# Patient Record
Sex: MALE | Race: WHITE | NOT HISPANIC OR LATINO | Employment: OTHER | URBAN - METROPOLITAN AREA
[De-identification: names, ages, dates, MRNs, and addresses within clinical notes are randomized per-mention and may not be internally consistent; named-entity substitution may affect disease eponyms.]

---

## 2019-09-24 ENCOUNTER — APPOINTMENT (OUTPATIENT)
Dept: PREADMISSION TESTING | Facility: HOSPITAL | Age: 79
End: 2019-09-24

## 2019-09-24 ENCOUNTER — CONSULT (OUTPATIENT)
Dept: CARDIOLOGY | Facility: CLINIC | Age: 79
End: 2019-09-24

## 2019-09-24 VITALS
HEART RATE: 70 BPM | WEIGHT: 181.6 LBS | DIASTOLIC BLOOD PRESSURE: 54 MMHG | HEIGHT: 73 IN | BODY MASS INDEX: 24.07 KG/M2 | SYSTOLIC BLOOD PRESSURE: 110 MMHG

## 2019-09-24 DIAGNOSIS — I48.3 TYPICAL ATRIAL FLUTTER (HCC): ICD-10-CM

## 2019-09-24 DIAGNOSIS — I50.21 ACUTE SYSTOLIC CONGESTIVE HEART FAILURE (HCC): ICD-10-CM

## 2019-09-24 DIAGNOSIS — I48.3 TYPICAL ATRIAL FLUTTER (HCC): Primary | ICD-10-CM

## 2019-09-24 LAB
ALBUMIN SERPL-MCNC: 4.9 G/DL (ref 3.5–5.2)
ALBUMIN/GLOB SERPL: 1.9 G/DL
ALP SERPL-CCNC: 82 U/L (ref 39–117)
ALT SERPL W P-5'-P-CCNC: 31 U/L (ref 1–41)
ANION GAP SERPL CALCULATED.3IONS-SCNC: 10 MMOL/L (ref 5–15)
AST SERPL-CCNC: 34 U/L (ref 1–40)
BILIRUB SERPL-MCNC: 0.7 MG/DL (ref 0.2–1.2)
BUN BLD-MCNC: 31 MG/DL (ref 8–23)
BUN/CREAT SERPL: 28.2 (ref 7–25)
CALCIUM SPEC-SCNC: 10 MG/DL (ref 8.6–10.5)
CHLORIDE SERPL-SCNC: 97 MMOL/L (ref 98–107)
CO2 SERPL-SCNC: 34 MMOL/L (ref 22–29)
CREAT BLD-MCNC: 1.1 MG/DL (ref 0.76–1.27)
DEPRECATED RDW RBC AUTO: 42.6 FL (ref 37–54)
ERYTHROCYTE [DISTWIDTH] IN BLOOD BY AUTOMATED COUNT: 13.2 % (ref 12.3–15.4)
GFR SERPL CREATININE-BSD FRML MDRD: 65 ML/MIN/1.73
GLOBULIN UR ELPH-MCNC: 2.6 GM/DL
GLUCOSE BLD-MCNC: 83 MG/DL (ref 65–99)
HCT VFR BLD AUTO: 41.1 % (ref 37.5–51)
HGB BLD-MCNC: 13 G/DL (ref 13–17.7)
MCH RBC QN AUTO: 27.7 PG (ref 26.6–33)
MCHC RBC AUTO-ENTMCNC: 31.6 G/DL (ref 31.5–35.7)
MCV RBC AUTO: 87.4 FL (ref 79–97)
PLATELET # BLD AUTO: 106 10*3/MM3 (ref 140–450)
PMV BLD AUTO: 12.8 FL (ref 6–12)
POTASSIUM BLD-SCNC: 4.6 MMOL/L (ref 3.5–5.2)
PROT SERPL-MCNC: 7.5 G/DL (ref 6–8.5)
RBC # BLD AUTO: 4.7 10*6/MM3 (ref 4.14–5.8)
SODIUM BLD-SCNC: 141 MMOL/L (ref 136–145)
WBC NRBC COR # BLD: 6.43 10*3/MM3 (ref 3.4–10.8)

## 2019-09-24 PROCEDURE — 36415 COLL VENOUS BLD VENIPUNCTURE: CPT

## 2019-09-24 PROCEDURE — 85027 COMPLETE CBC AUTOMATED: CPT | Performed by: INTERNAL MEDICINE

## 2019-09-24 PROCEDURE — 80053 COMPREHEN METABOLIC PANEL: CPT | Performed by: INTERNAL MEDICINE

## 2019-09-24 PROCEDURE — 99204 OFFICE O/P NEW MOD 45 MIN: CPT | Performed by: INTERNAL MEDICINE

## 2019-09-24 RX ORDER — METOPROLOL SUCCINATE 100 MG/1
100 TABLET, EXTENDED RELEASE ORAL EVERY MORNING
Refills: 5 | Status: ON HOLD | COMMUNITY
Start: 2019-09-10 | End: 2019-10-02 | Stop reason: SDUPTHER

## 2019-09-24 RX ORDER — GLIMEPIRIDE 2 MG/1
2 TABLET ORAL DAILY
Refills: 5 | COMMUNITY
Start: 2019-09-10 | End: 2023-02-17

## 2019-09-24 RX ORDER — DIGOXIN 125 MCG
125 TABLET ORAL DAILY
Refills: 0 | COMMUNITY
Start: 2019-09-03 | End: 2019-10-02 | Stop reason: HOSPADM

## 2019-09-24 RX ORDER — LOSARTAN POTASSIUM 50 MG/1
50 TABLET ORAL DAILY
Refills: 0 | Status: ON HOLD | COMMUNITY
Start: 2019-09-03 | End: 2019-10-02 | Stop reason: SDUPTHER

## 2019-09-24 RX ORDER — METOPROLOL SUCCINATE 50 MG/1
50 TABLET, EXTENDED RELEASE ORAL DAILY
Refills: 0 | COMMUNITY
Start: 2019-09-03 | End: 2019-10-02 | Stop reason: HOSPADM

## 2019-09-24 RX ORDER — BUMETANIDE 1 MG/1
2 TABLET ORAL DAILY
Refills: 0 | Status: ON HOLD | COMMUNITY
Start: 2019-09-03 | End: 2019-10-02 | Stop reason: SDUPTHER

## 2019-09-24 RX ORDER — APIXABAN 5 MG/1
5 TABLET, FILM COATED ORAL 2 TIMES DAILY
Refills: 0 | COMMUNITY
Start: 2019-09-03 | End: 2020-07-08 | Stop reason: SDUPTHER

## 2019-09-24 RX ORDER — ATORVASTATIN CALCIUM 40 MG/1
40 TABLET, FILM COATED ORAL DAILY
Refills: 2 | Status: ON HOLD | COMMUNITY
Start: 2019-08-02 | End: 2019-10-02 | Stop reason: SDUPTHER

## 2019-09-24 RX ORDER — BIOTIN 1 MG
TABLET ORAL SEE ADMIN INSTRUCTIONS
Refills: 0 | COMMUNITY
Start: 2019-09-03 | End: 2019-09-24

## 2019-09-24 NOTE — PROGRESS NOTES
Sung Merino  1940  PCP: Chemo Sahu MD    SUBJECTIVE:   Sung Merino is a 79 y.o. male seen for a consultation visit regarding the following:     Chief Complaint:   Chief Complaint   Patient presents with   • Irregular Heart Beat     CONSULT          Consultation is requested by Andrews Butler MD for evaluation of Irregular Heart Beat (CONSULT)        History:  This is a 79-year-old patient referred by Dr. Butler for further evaluation and management of atrial flutter and chronic systolic heart failure.  The patient presented to the hospital in [place] in August 2019 with acute systolic heart failure and atrial flutter with rapid rates.  He was rate controlled for the atrial flutter and treated for acute systolic heart failure.  His EF measured 25%.  The echo also showed pulmonary hypertension.  Prior to admission the patient reported having 5 days of shortness of breath and tachycardia.      Cardiac PMH: (Old records have been reviewed and summarized below)  1.  Atrial flutter-diagnosed August 2019  2.  Acute systolic heart failure-EF 25%-August 2019  3.  Pulmonary hypertension-by echo-August 2019  4.  Diabetes  5.  Hypertension    Past Medical History, Past Surgical History, Family history, Social History, and Medications were all reviewed with the patient today and updated as necessary.       Current Outpatient Medications:   •  atorvastatin (LIPITOR) 40 MG tablet, Take 40 mg by mouth Daily., Disp: , Rfl: 2  •  bumetanide (BUMEX) 1 MG tablet, Take 2 mg by mouth Daily., Disp: , Rfl: 0  •  digoxin (LANOXIN) 125 MCG tablet, Take 125 mcg by mouth Daily., Disp: , Rfl: 0  •  ELIQUIS 5 MG tablet tablet, Take 5 mg by mouth 2 (Two) Times a Day. Dr. Zuñiga instructed patient to continue, Disp: , Rfl: 0  •  glimepiride (AMARYL) 2 MG tablet, Take 2 mg by mouth Daily., Disp: , Rfl: 5  •  losartan (COZAAR) 50 MG tablet, Take 50 mg by mouth Daily., Disp: , Rfl: 0  •  metoprolol succinate XL (TOPROL-XL) 100  MG 24 hr tablet, Take 100 mg by mouth Every Morning., Disp: , Rfl: 5  •  metoprolol succinate XL (TOPROL-XL) 50 MG 24 hr tablet, Take 50 mg by mouth Daily., Disp: , Rfl: 0    No Known Allergies      Past Medical History:   Diagnosis Date   • Abnormal heart rhythm    • Anxiety    • CHF (congestive heart failure) (CMS/HCC)    • Diabetes mellitus (CMS/HCC)    • Easy bruising    • Heart failure (CMS/HCC)    • Hernia, hiatal    • Hyperlipidemia    • Hypertension    • Irregular heart beat    • On home oxygen therapy     2L NC during the day; patient states he does not wear any at night   • Vascular disease    • Wears glasses     reading glasses     Past Surgical History:   Procedure Laterality Date   • CARDIAC CATHETERIZATION     • COLONOSCOPY     • POLYPECTOMY     • TUMOR REMOVAL      From colon      Family History   Problem Relation Age of Onset   • Heart attack Mother    • Cancer Father      Social History     Tobacco Use   • Smoking status: Former Smoker     Types: Cigarettes     Last attempt to quit: 2013     Years since quittin.0   • Smokeless tobacco: Former User     Types: Chew     Quit date: 2019   Substance Use Topics   • Alcohol use: No     Frequency: Never       ROS:  Review of Systems:  General: Increased wt gain and fatigue  Skin: no rashes, lumps, or other skin changes  HEENT: no dizziness, lightheadedness, or vision changes  Respiratory: no cough or hemoptysis  Cardiovascular: + SOB/SAUNDERS, increased swelling that has now improved  Gastrointestinal: no black/tarry stools or diarrhea  Urinary: no change in frequency or urgency  Peripheral Vascular: no claudication or leg cramps  Musculoskeletal: no muscle or joint pain/stiffness  Psychiatric: no depression or excessive stress  Neurological: no sensory or motor loss, no syncope  Hematologic: no anemia, easy bruising or bleeding  Endocrine: no thyroid problems, nor heat or cold intolerance       PHYSICAL EXAM:   /54 (BP Location: Left  "arm, Patient Position: Sitting)   Pulse 70   Ht 185.4 cm (73\")   Wt 82.4 kg (181 lb 9.6 oz)   BMI 23.96 kg/m²      Wt Readings from Last 5 Encounters:   09/24/19 82.4 kg (181 lb 9.6 oz)     BP Readings from Last 5 Encounters:   09/24/19 110/54       General-Well Nourished, Well developed  Eyes - PERRLA  Neck- supple, No mass  CV- regular rate and rhythm, no MRG  Lung- clear bilaterally  Abd- soft, +BS  Musc/skel - Norm strength and range of motion  Skin- warm and dry  Neuro - Alert & Oriented x 3, appropriate mood.    Medical problems and test results were reviewed with the patient today.     No results found for this or any previous visit.      No results found for: CHOL, HDL, HDLC, LDL, LDLC, VLDL    EKG:  (EKG/Tracing has been independently visualized by me and summarized below)      ECG 12 Lead  Date/Time: 9/25/2019 6:06 PM  Performed by: Adalberto Zuñiga MD  Authorized by: Adalberto Zuñiga MD   Comparison: not compared with previous ECG   Previous ECG: no previous ECG available  Rhythm: sinus rhythm  Rate: normal  BPM: 70  Conduction: right bundle branch block    Clinical impression: abnormal EKG            ASSESSMENT and PLAN  1.  Atrial flutter-most likely related to pulmonary hypertension-we will plan for atrial flutter ablation. We discussed potential Electrophysiology Study with possible ablation.  I described the procedures in detail including risks, alternatives, and benefits.  We also discussed that risks include bleeding, vascular damage, stroke, MI, esophageal damage, cardiac perforation, and even death.  2.  Acute systolic heart failure-unclear if heart failure induced A flutter or if flutter induced heart failure.  He currently has a LifeVest in place.  We will check a echo in the hospital to determine if his LifeVest needs to be continued.  3.  Pulmonary hypertension-remeasure on echo    Return for after procedure.    1. Atrial Flutter ablation  2. ECHO day of ablation to see if Life Vest " needs to be continued.   3. No Meds to hold        Adalberto Zuñiga M.D., FBRIANC, F.H.R.S.  Cardiology/Electrophysiology  09/25/19  6:05 PM

## 2019-09-24 NOTE — PAT

## 2019-09-25 PROBLEM — I50.21 ACUTE SYSTOLIC CONGESTIVE HEART FAILURE: Status: ACTIVE | Noted: 2019-09-25

## 2019-09-25 PROCEDURE — 93000 ELECTROCARDIOGRAM COMPLETE: CPT | Performed by: INTERNAL MEDICINE

## 2019-10-02 ENCOUNTER — HOSPITAL ENCOUNTER (OUTPATIENT)
Facility: HOSPITAL | Age: 79
Setting detail: HOSPITAL OUTPATIENT SURGERY
Discharge: HOME | End: 2019-10-02
Attending: INTERNAL MEDICINE | Admitting: INTERNAL MEDICINE

## 2019-10-02 ENCOUNTER — APPOINTMENT (OUTPATIENT)
Dept: CARDIOLOGY | Facility: HOSPITAL | Age: 79
End: 2019-10-02

## 2019-10-02 VITALS
TEMPERATURE: 97.1 F | HEART RATE: 61 BPM | RESPIRATION RATE: 12 BRPM | WEIGHT: 177 LBS | DIASTOLIC BLOOD PRESSURE: 65 MMHG | HEIGHT: 73 IN | BODY MASS INDEX: 23.46 KG/M2 | SYSTOLIC BLOOD PRESSURE: 134 MMHG | OXYGEN SATURATION: 95 %

## 2019-10-02 DIAGNOSIS — I48.3 TYPICAL ATRIAL FLUTTER (HCC): ICD-10-CM

## 2019-10-02 LAB
ASCENDING AORTA: 3 CM
AV VENA CONTRACTA: 0.4 CM
BH CV ECHO MEAS - AI DEC SLOPE: 232.5 CM/SEC^2
BH CV ECHO MEAS - AI MAX PG: 47.7 MMHG
BH CV ECHO MEAS - AI MAX VEL: 345.4 CM/SEC
BH CV ECHO MEAS - AI P1/2T: 435 MSEC
BH CV ECHO MEAS - AO MAX PG (FULL): 4.2 MMHG
BH CV ECHO MEAS - AO MAX PG: 6.7 MMHG
BH CV ECHO MEAS - AO MEAN PG (FULL): 2.1 MMHG
BH CV ECHO MEAS - AO MEAN PG: 3.3 MMHG
BH CV ECHO MEAS - AO ROOT DIAM: 3.3 CM
BH CV ECHO MEAS - AO V2 MAX: 129.3 CM/SEC
BH CV ECHO MEAS - AO V2 MEAN: 86.3 CM/SEC
BH CV ECHO MEAS - AO V2 VTI: 27 CM
BH CV ECHO MEAS - ASC AORTA: 3 CM
BH CV ECHO MEAS - AVA(I,A): 1.9 CM^2
BH CV ECHO MEAS - AVA(I,D): 1.9 CM^2
BH CV ECHO MEAS - AVA(V,A): 1.8 CM^2
BH CV ECHO MEAS - AVA(V,D): 1.8 CM^2
BH CV ECHO MEAS - BSA(HAYCOCK): 2 M^2
BH CV ECHO MEAS - BSA: 2 M^2
BH CV ECHO MEAS - BZI_BMI: 23.4 KILOGRAMS/M^2
BH CV ECHO MEAS - BZI_METRIC_HEIGHT: 185.4 CM
BH CV ECHO MEAS - BZI_METRIC_WEIGHT: 80.3 KG
BH CV ECHO MEAS - EDV(CUBED): 89.5 ML
BH CV ECHO MEAS - EDV(MOD-SP2): 124 ML
BH CV ECHO MEAS - EDV(MOD-SP4): 128 ML
BH CV ECHO MEAS - EDV(TEICH): 91.1 ML
BH CV ECHO MEAS - EF(CUBED): 74.6 %
BH CV ECHO MEAS - EF(MOD-BP): 54 %
BH CV ECHO MEAS - EF(MOD-SP2): 56.5 %
BH CV ECHO MEAS - EF(MOD-SP4): 51.6 %
BH CV ECHO MEAS - EF(TEICH): 66.6 %
BH CV ECHO MEAS - ESV(CUBED): 22.7 ML
BH CV ECHO MEAS - ESV(MOD-SP2): 54 ML
BH CV ECHO MEAS - ESV(MOD-SP4): 62 ML
BH CV ECHO MEAS - ESV(TEICH): 30.4 ML
BH CV ECHO MEAS - FS: 36.7 %
BH CV ECHO MEAS - IVS/LVPW: 1.1
BH CV ECHO MEAS - IVSD: 1.1 CM
BH CV ECHO MEAS - LA DIMENSION: 3.2 CM
BH CV ECHO MEAS - LAD MAJOR: 5.8 CM
BH CV ECHO MEAS - LAT PEAK E' VEL: 7.6 CM/SEC
BH CV ECHO MEAS - LATERAL E/E' RATIO: 6.5
BH CV ECHO MEAS - LV DIASTOLIC VOL/BSA (35-75): 62.7 ML/M^2
BH CV ECHO MEAS - LV MASS(C)D: 159.8 GRAMS
BH CV ECHO MEAS - LV MASS(C)DI: 78.2 GRAMS/M^2
BH CV ECHO MEAS - LV MAX PG: 2.5 MMHG
BH CV ECHO MEAS - LV MEAN PG: 1.3 MMHG
BH CV ECHO MEAS - LV SYSTOLIC VOL/BSA (12-30): 30.3 ML/M^2
BH CV ECHO MEAS - LV V1 MAX: 78.4 CM/SEC
BH CV ECHO MEAS - LV V1 MEAN: 51 CM/SEC
BH CV ECHO MEAS - LV V1 VTI: 17.1 CM
BH CV ECHO MEAS - LVIDD: 4.5 CM
BH CV ECHO MEAS - LVIDS: 2.8 CM
BH CV ECHO MEAS - LVLD AP2: 8.6 CM
BH CV ECHO MEAS - LVLD AP4: 8.7 CM
BH CV ECHO MEAS - LVLS AP2: 6.7 CM
BH CV ECHO MEAS - LVLS AP4: 6.7 CM
BH CV ECHO MEAS - LVOT AREA (M): 3.1 CM^2
BH CV ECHO MEAS - LVOT AREA: 3 CM^2
BH CV ECHO MEAS - LVOT DIAM: 2 CM
BH CV ECHO MEAS - LVPWD: 1 CM
BH CV ECHO MEAS - MED PEAK E' VEL: 7.2 CM/SEC
BH CV ECHO MEAS - MEDIAL E/E' RATIO: 6.8
BH CV ECHO MEAS - MV A MAX VEL: 63.1 CM/SEC
BH CV ECHO MEAS - MV DEC TIME: 0.35 SEC
BH CV ECHO MEAS - MV E MAX VEL: 50.5 CM/SEC
BH CV ECHO MEAS - MV E/A: 0.8
BH CV ECHO MEAS - MV MAX PG: 3.3 MMHG
BH CV ECHO MEAS - MV MEAN PG: 1.3 MMHG
BH CV ECHO MEAS - MV V2 MAX: 90.7 CM/SEC
BH CV ECHO MEAS - MV V2 MEAN: 51.7 CM/SEC
BH CV ECHO MEAS - MV V2 VTI: 26.4 CM
BH CV ECHO MEAS - MVA(VTI): 2 CM^2
BH CV ECHO MEAS - PA ACC SLOPE: 287.5 CM/SEC^2
BH CV ECHO MEAS - PA ACC TIME: 0.14 SEC
BH CV ECHO MEAS - PA MAX PG: 2.7 MMHG
BH CV ECHO MEAS - PA PR(ACCEL): 17.2 MMHG
BH CV ECHO MEAS - PA V2 MAX: 81.4 CM/SEC
BH CV ECHO MEAS - RAP SYSTOLE: 3 MMHG
BH CV ECHO MEAS - RVSP: 28 MMHG
BH CV ECHO MEAS - SI(CUBED): 32.7 ML/M^2
BH CV ECHO MEAS - SI(LVOT): 25.2 ML/M^2
BH CV ECHO MEAS - SI(MOD-SP2): 34.3 ML/M^2
BH CV ECHO MEAS - SI(MOD-SP4): 32.3 ML/M^2
BH CV ECHO MEAS - SI(TEICH): 29.7 ML/M^2
BH CV ECHO MEAS - SV(CUBED): 66.7 ML
BH CV ECHO MEAS - SV(LVOT): 51.5 ML
BH CV ECHO MEAS - SV(MOD-SP2): 70 ML
BH CV ECHO MEAS - SV(MOD-SP4): 66 ML
BH CV ECHO MEAS - SV(TEICH): 60.7 ML
BH CV ECHO MEAS - TAPSE (>1.6): 2.4 CM2
BH CV ECHO MEAS - TR MAX PG: 25 MMHG
BH CV ECHO MEAS - TR MAX VEL: 249.8 CM/SEC
BH CV ECHO MEASUREMENTS AVERAGE E/E' RATIO: 6.82
BH CV VAS BP LEFT ARM: NORMAL MMHG
BH CV XLRA - RV BASE: 3.7 CM
BH CV XLRA - RV LENGTH: 7 CM
BH CV XLRA - RV MID: 3.2 CM
BH CV XLRA - TDI S': 11.4 CM/SEC
GLUCOSE BLDC GLUCOMTR-MCNC: 109 MG/DL (ref 70–130)
GLUCOSE BLDC GLUCOMTR-MCNC: 94 MG/DL (ref 70–130)
LEFT ATRIUM VOLUME INDEX: 29.4 ML/M^2
LEFT ATRIUM VOLUME: 60 ML
MV VENA CONTRACTA: 0.4 CM

## 2019-10-02 PROCEDURE — 25010000002 MIDAZOLAM PER 1 MG: Performed by: INTERNAL MEDICINE

## 2019-10-02 PROCEDURE — 82962 GLUCOSE BLOOD TEST: CPT

## 2019-10-02 PROCEDURE — 93306 TTE W/DOPPLER COMPLETE: CPT

## 2019-10-02 PROCEDURE — 99153 MOD SED SAME PHYS/QHP EA: CPT | Performed by: INTERNAL MEDICINE

## 2019-10-02 PROCEDURE — 25010000002 FENTANYL CITRATE (PF) 100 MCG/2ML SOLUTION: Performed by: INTERNAL MEDICINE

## 2019-10-02 PROCEDURE — C1894 INTRO/SHEATH, NON-LASER: HCPCS | Performed by: INTERNAL MEDICINE

## 2019-10-02 PROCEDURE — C1730 CATH, EP, 19 OR FEW ELECT: HCPCS | Performed by: INTERNAL MEDICINE

## 2019-10-02 PROCEDURE — 93306 TTE W/DOPPLER COMPLETE: CPT | Performed by: INTERNAL MEDICINE

## 2019-10-02 PROCEDURE — 93653 COMPRE EP EVAL TX SVT: CPT | Performed by: INTERNAL MEDICINE

## 2019-10-02 PROCEDURE — C1731 CATH, EP, 20 OR MORE ELEC: HCPCS | Performed by: INTERNAL MEDICINE

## 2019-10-02 PROCEDURE — 99152 MOD SED SAME PHYS/QHP 5/>YRS: CPT | Performed by: INTERNAL MEDICINE

## 2019-10-02 PROCEDURE — 93010 ELECTROCARDIOGRAM REPORT: CPT | Performed by: INTERNAL MEDICINE

## 2019-10-02 PROCEDURE — 25010000002 SULFUR HEXAFLUORIDE MICROSPH 60.7-25 MG RECONSTITUTED SUSPENSION: Performed by: PHYSICIAN ASSISTANT

## 2019-10-02 PROCEDURE — 93005 ELECTROCARDIOGRAM TRACING: CPT | Performed by: INTERNAL MEDICINE

## 2019-10-02 PROCEDURE — 93609 INTRA-VNTR MAPG TCHYCAR SITE: CPT | Performed by: INTERNAL MEDICINE

## 2019-10-02 PROCEDURE — S0260 H&P FOR SURGERY: HCPCS | Performed by: INTERNAL MEDICINE

## 2019-10-02 PROCEDURE — 93621 COMP EP EVL L PAC&REC C SINS: CPT | Performed by: INTERNAL MEDICINE

## 2019-10-02 PROCEDURE — C1733 CATH, EP, OTHR THAN COOL-TIP: HCPCS | Performed by: INTERNAL MEDICINE

## 2019-10-02 PROCEDURE — 25010000003 LIDOCAINE 1 % SOLUTION: Performed by: INTERNAL MEDICINE

## 2019-10-02 RX ORDER — FENTANYL CITRATE 50 UG/ML
INJECTION, SOLUTION INTRAMUSCULAR; INTRAVENOUS AS NEEDED
Status: DISCONTINUED | OUTPATIENT
Start: 2019-10-02 | End: 2019-10-02 | Stop reason: HOSPADM

## 2019-10-02 RX ORDER — BUMETANIDE 1 MG/1
1 TABLET ORAL DAILY
Qty: 30 TABLET | Refills: 3
Start: 2019-10-02 | End: 2019-10-02 | Stop reason: SDUPTHER

## 2019-10-02 RX ORDER — MIDAZOLAM HYDROCHLORIDE 1 MG/ML
INJECTION INTRAMUSCULAR; INTRAVENOUS AS NEEDED
Status: DISCONTINUED | OUTPATIENT
Start: 2019-10-02 | End: 2019-10-02 | Stop reason: HOSPADM

## 2019-10-02 RX ORDER — ACETAMINOPHEN 160 MG/5ML
650 SOLUTION ORAL EVERY 4 HOURS PRN
Status: DISCONTINUED | OUTPATIENT
Start: 2019-10-02 | End: 2019-10-02 | Stop reason: HOSPADM

## 2019-10-02 RX ORDER — METOPROLOL SUCCINATE 100 MG/1
100 TABLET, EXTENDED RELEASE ORAL EVERY MORNING
Qty: 30 TABLET | Refills: 5 | Status: SHIPPED | OUTPATIENT
Start: 2019-10-02 | End: 2020-07-08 | Stop reason: SDUPTHER

## 2019-10-02 RX ORDER — IBUPROFEN 400 MG/1
400 TABLET ORAL EVERY 6 HOURS PRN
Status: DISCONTINUED | OUTPATIENT
Start: 2019-10-02 | End: 2019-10-02 | Stop reason: HOSPADM

## 2019-10-02 RX ORDER — LIDOCAINE HYDROCHLORIDE 10 MG/ML
INJECTION, SOLUTION INFILTRATION; PERINEURAL AS NEEDED
Status: DISCONTINUED | OUTPATIENT
Start: 2019-10-02 | End: 2019-10-02 | Stop reason: HOSPADM

## 2019-10-02 RX ORDER — ONDANSETRON 2 MG/ML
4 INJECTION INTRAMUSCULAR; INTRAVENOUS EVERY 6 HOURS PRN
Status: DISCONTINUED | OUTPATIENT
Start: 2019-10-02 | End: 2019-10-02 | Stop reason: HOSPADM

## 2019-10-02 RX ORDER — OXYCODONE HYDROCHLORIDE AND ACETAMINOPHEN 5; 325 MG/1; MG/1
1 TABLET ORAL EVERY 4 HOURS PRN
Status: DISCONTINUED | OUTPATIENT
Start: 2019-10-02 | End: 2019-10-02 | Stop reason: HOSPADM

## 2019-10-02 RX ORDER — ATORVASTATIN CALCIUM 40 MG/1
40 TABLET, FILM COATED ORAL DAILY
Qty: 30 TABLET | Refills: 2 | Status: SHIPPED | OUTPATIENT
Start: 2019-10-02 | End: 2019-12-30

## 2019-10-02 RX ORDER — ACETAMINOPHEN 325 MG/1
650 TABLET ORAL EVERY 4 HOURS PRN
Status: DISCONTINUED | OUTPATIENT
Start: 2019-10-02 | End: 2019-10-02 | Stop reason: HOSPADM

## 2019-10-02 RX ORDER — LOSARTAN POTASSIUM 50 MG/1
50 TABLET ORAL DAILY
Qty: 30 TABLET | Refills: 2 | Status: SHIPPED | OUTPATIENT
Start: 2019-10-02 | End: 2019-12-30

## 2019-10-02 RX ORDER — BUMETANIDE 1 MG/1
1 TABLET ORAL DAILY
Qty: 30 TABLET | Refills: 3
Start: 2019-10-02 | End: 2020-07-08 | Stop reason: SDUPTHER

## 2019-10-02 RX ORDER — ACETAMINOPHEN 650 MG/1
650 SUPPOSITORY RECTAL EVERY 4 HOURS PRN
Status: DISCONTINUED | OUTPATIENT
Start: 2019-10-02 | End: 2019-10-02 | Stop reason: HOSPADM

## 2019-10-02 RX ADMIN — SULFUR HEXAFLUORIDE 2 ML: KIT at 16:35

## 2019-10-02 NOTE — PROCEDURES
PRE-ELECTROPHYSIOLOGY STUDY DIAGNOSES  1. Typical atrial flutter.     PROCEDURE PERFORMED  1. Electrophysiology testing with right-sided atrial flutter ablation.  2. Left atrial pacing recording from the coronary sinus.  3. Interatrial mapping.    Anesthesia:    I was present with the patient for the duration of moderate sedation and supervised staff who had no other duties and monitored the patient for the entire procedure     Name of independent trained observer: Anna Yuan RN  Intra-Service start time: 1320  Intra-Service end time: 1349    Estimated Blood Loss: Less than 10 mL     Specimens: None     PROCEDURE IN DETAIL: The patient was brought into the EP lab in a fasting  state. The right and left groins were prepped and draped in the usual  sterile fashion. Access was obtained in the right femoral vein via the  Seldinger technique over which an 8 and 7-Tuvaluan sheath was placed.  Access was obtained in the left femoral vein via the Seldinger technique  over which a 5-Tuvaluan sheath was placed. Through the 7-Tuvaluan sheath, a  Halo mapping catheter was placed in the high right atrium. Through the  5-Tuvaluan sheath, a 5-Tuvaluan catheter was placed at the RV apex. Through  the 8-Tuvaluan sheath, a large curved 8 mm Blazer II ablation catheter was  placed in the coronary sinus. Pacing, recording and mapping from the left atrium was done.  Pacing across the isthmus preablation was done with conduction times measured. A Right-sided atrial flutter line was then performed using 70 W of energy, 60 degree heat for  120 seconds. Two lines were placed.  Ablation  catheter was then placed back into the coronary sinus. Pacing from the  coronary sinus showed bidirectional block across the tricuspid annulus.  The His bundle was then mapped out and formal electrophysiology test was  performed.     1. Baseline rhythm showed normal sinus heart rhythm with an R-R interval of 998,   2. NC interval of 202,   3. QRS of 146,   4. QT  of 453,   5. AH of 91,   6. HV of 61.    7. Sinus node recovery time at 600 was 1132 at 400 was 974.   8. The AV Wenckebach cycle length was 480.   9. AV node refractory period at 600 was 350  10. The VA Wenckebach cycle length was 0.   11. VA node refractory period at 600 was 0, at 400 was 0  12. The ventricular effective refractory period at 600 was 210, at 400 was 210.     The sheaths were then pulled. Hemostasis was achieved. The patient recovered from his sedation, transferred from the lab in a stable condition.     IMPRESSION:   1. Successful catheter mapping ablation of right-sided isthmus-dependent atrial flutter substrate.

## 2019-10-02 NOTE — H&P
Primary Cardiologist: Dr. Butelr     Chief Complaint: aflutter       Subjective:     Patient is a 79 y.o. male who presents with typical atrial flutter for EPS +/- RFA. He was recently seen in the office last week and has not had any changes since that time. No cp, fevers, chills.       History:  This is a 79-year-old patient referred by Dr. Butler for further evaluation and management of atrial flutter and chronic systolic heart failure.  The patient presented to the hospital in [place] in 2019 with acute systolic heart failure and atrial flutter with rapid rates.  He was rate controlled for the atrial flutter and treated for acute systolic heart failure.  His EF measured 25%.  The echo also showed pulmonary hypertension.  Prior to admission the patient reported having 5 days of shortness of breath and tachycardia.        Cardiac PMH: (Old records have been reviewed and summarized below)  1.  Atrial flutter-diagnosed 2019  2.  Acute systolic heart failure-EF 25%-2019  3.  Pulmonary hypertension-by echo-2019  4.  Diabetes  5.  Hypertension    Past Medical History:   Diagnosis Date   • Abnormal heart rhythm    • Anxiety    • CHF (congestive heart failure) (CMS/HCC)    • Diabetes mellitus (CMS/HCC)    • Easy bruising    • Heart failure (CMS/HCC)    • Hernia, hiatal    • Hyperlipidemia    • Hypertension    • Irregular heart beat    • On home oxygen therapy     2L NC during the day; patient states he does not wear any at night   • Vascular disease    • Wears glasses     reading glasses      Past Surgical History:   Procedure Laterality Date   • CARDIAC CATHETERIZATION     • COLONOSCOPY     • POLYPECTOMY     • TUMOR REMOVAL      From colon       No Known Allergies  Social History     Tobacco Use   • Smoking status: Former Smoker     Types: Cigarettes     Last attempt to quit: 2013     Years since quittin.0   • Smokeless tobacco: Former User     Types: Chew     Quit date: 2019    Substance Use Topics   • Alcohol use: No     Frequency: Never      FH:   Family History   Problem Relation Age of Onset   • Heart attack Mother    • Cancer Father         No current facility-administered medications for this encounter.     Review of Systems  Review of Systems:  General: no recent weight loss/gain, weakness or fatigue  Skin: no rashes, lumps, or other skin changes  HEENT: no dizziness, lightheadedness, or vision changes  Respiratory: no cough or hemoptysis  Cardiovascular: + palpitations, and tachycardia  Gastrointestinal: no black/tarry stools or diarrhea  Urinary: no change in frequency or urgency  Peripheral Vascular: no claudication or leg cramps  Musculoskeletal: no muscle or joint pain/stiffness  Psychiatric: no depression or excessive stress  Neurological: no sensory or motor loss, no syncope  Hematologic: no anemia, easy bruising or bleeding  Endocrine: no thyroid problems, nor heat or cold intolerance        Objective:       There were no vitals taken for this visit.    No intake/output data recorded.  No intake/output data recorded.    Physical Exam:  General-Well Nourished, Well developed  Eyes - PERRLA  Neck- supple, No mass  CV- regular rate and rhythm, no MRG  Lung- clear bilaterally  Abd- soft, +BS  Musc/skel - Norm strength and range of motion  Skin- warm and dry  Neuro - Alert & Oriented x 3, appropriate mood.      Data Review:     No results found for this or any previous visit (from the past 24 hour(s)).        Assessment:     Typical atrial flutter (CMS/HCC)         Plan:     1.  Atrial flutter-most likely related to pulmonary hypertension-we will plan for atrial flutter ablation. We discussed potential Electrophysiology Study with possible ablation.  I described the procedures in detail including risks, alternatives, and benefits.  We also discussed that risks include bleeding, vascular damage, stroke, MI, esophageal damage, cardiac perforation, and even death.  2.  Acute  systolic heart failure-unclear if heart failure induced A flutter or if flutter induced heart failure.  He currently has a LifeVest in place.  We will recheck an echo today to determine if his LifeVest needs to be continued.  3.  Pulmonary hypertension-remeasure on echo    Electronically signed by ALBERTO Yepez, 10/02/19, 10:18 AM.

## 2019-11-05 ENCOUNTER — OFFICE VISIT (OUTPATIENT)
Dept: CARDIOLOGY | Facility: CLINIC | Age: 79
End: 2019-11-05

## 2019-11-05 VITALS
HEART RATE: 70 BPM | DIASTOLIC BLOOD PRESSURE: 74 MMHG | WEIGHT: 179 LBS | OXYGEN SATURATION: 94 % | SYSTOLIC BLOOD PRESSURE: 132 MMHG | HEIGHT: 73 IN | BODY MASS INDEX: 23.72 KG/M2

## 2019-11-05 DIAGNOSIS — I50.21 ACUTE SYSTOLIC CONGESTIVE HEART FAILURE (HCC): ICD-10-CM

## 2019-11-05 DIAGNOSIS — I48.3 TYPICAL ATRIAL FLUTTER (HCC): Primary | ICD-10-CM

## 2019-11-05 PROCEDURE — 99213 OFFICE O/P EST LOW 20 MIN: CPT | Performed by: INTERNAL MEDICINE

## 2019-11-05 PROCEDURE — 93000 ELECTROCARDIOGRAM COMPLETE: CPT | Performed by: INTERNAL MEDICINE

## 2019-11-05 NOTE — PROGRESS NOTES
Sung Merino  1940  PCP: Chemo Sahu MD    SUBJECTIVE:   Sung Merino is a 79 y.o. male seen for a consultation visit regarding the following:     Chief Complaint:   Chief Complaint   Patient presents with   • Typical atrial flutter        HPI:  Patient has been cardiac stable. Feeling much better. No further episodes.     History:  This is a 79-year-old patient referred by Dr. Butler for further evaluation and management of atrial flutter and chronic systolic heart failure.  The patient presented to the hospital in [place] in August 2019 with acute systolic heart failure and atrial flutter with rapid rates.  He was rate controlled for the atrial flutter and treated for acute systolic heart failure.  His EF measured 25%.  The echo also showed pulmonary hypertension.  Prior to admission the patient reported having 5 days of shortness of breath and tachycardia.      Cardiac PMH: (Old records have been reviewed and summarized below)  1.  Atrial flutter-diagnosed August 2019  2.  Acute systolic heart failure-EF 25%-August 2019  3.  Pulmonary hypertension-by echo-August 2019  4.  Diabetes  5.  Hypertension    Past Medical History, Past Surgical History, Family history, Social History, and Medications were all reviewed with the patient today and updated as necessary.       Current Outpatient Medications:   •  atorvastatin (LIPITOR) 40 MG tablet, Take 1 tablet by mouth Daily., Disp: 30 tablet, Rfl: 2  •  bumetanide (BUMEX) 1 MG tablet, Take 1 tablet by mouth Daily. (Patient taking differently: Take 1 mg by mouth Daily. 2 tabs daily), Disp: 30 tablet, Rfl: 3  •  ELIQUIS 5 MG tablet tablet, Take 5 mg by mouth 2 (Two) Times a Day. Dr. Zuñiga instructed patient to continue, Disp: , Rfl: 0  •  glimepiride (AMARYL) 2 MG tablet, Take 2 mg by mouth Daily., Disp: , Rfl: 5  •  losartan (COZAAR) 50 MG tablet, Take 1 tablet by mouth Daily., Disp: 30 tablet, Rfl: 2  •  metoprolol succinate XL (TOPROL-XL) 100 MG 24  "hr tablet, Take 1 tablet by mouth Every Morning., Disp: 30 tablet, Rfl: 5    No Known Allergies      Past Medical History:   Diagnosis Date   • Abnormal heart rhythm    • Anxiety    • CHF (congestive heart failure) (CMS/HCC)    • Diabetes mellitus (CMS/HCC)    • Easy bruising    • Heart failure (CMS/HCC)    • Hernia, hiatal    • Hyperlipidemia    • Hypertension    • Irregular heart beat    • On home oxygen therapy     2L NC during the day; patient states he does not wear any at night   • Vascular disease    • Wears glasses     reading glasses     Past Surgical History:   Procedure Laterality Date   • CARDIAC CATHETERIZATION     • CARDIAC ELECTROPHYSIOLOGY PROCEDURE N/A 10/2/2019    Procedure: Ablation atrial flutter;  Surgeon: Adalberto Zuñiga MD;  Location: [facility] INVASIVE LOC[facility];  Service: Cardiovascular   • COLONOSCOPY     • POLYPECTOMY     • TUMOR REMOVAL      From colon      Family History   Problem Relation Age of Onset   • Heart attack Mother    • Cancer Father      Social History     Tobacco Use   • Smoking status: Former Smoker     Types: Cigarettes     Last attempt to quit: 2013     Years since quittin.1   • Smokeless tobacco: Former User     Types: Chew     Quit date: 2019   Substance Use Topics   • Alcohol use: No     Frequency: Never           PHYSICAL EXAM:   /74 (BP Location: Left arm, Patient Position: Sitting)   Pulse 70   Ht 185.4 cm (73\")   Wt 81.2 kg (179 lb)   SpO2 94%   BMI 23.62 kg/m²      Wt Readings from Last 5 Encounters:   19 81.2 kg (179 lb)   10/02/19 80.3 kg (177 lb)   19 82.4 kg (181 lb 9.6 oz)     BP Readings from Last 5 Encounters:   19 132/74   10/02/19 134/65   19 110/54       General-Well Nourished, Well developed  Eyes - PERRLA  Neck- supple, No mass  CV- regular rate and rhythm, no MRG  Lung- clear bilaterally  Abd- soft, +BS  Musc/skel - Norm strength and range of motion  Skin- warm and dry  Neuro - Alert & Oriented x " 3, appropriate mood.    Medical problems and test results were reviewed with the patient today.     Results for orders placed or performed during the hospital encounter of 10/02/19   POC Glucose Once   Result Value Ref Range    Glucose 94 70 - 130 mg/dL   POC Glucose Once   Result Value Ref Range    Glucose 109 70 - 130 mg/dL   Adult Transthoracic Echo Complete W/ Cont if Necessary Per Protocol   Result Value Ref Range    BH CV VAS BP LEFT /59 mmHg    BSA 2.0 m^2    IVSd 1.1 cm    LVIDd 4.5 cm    LVIDs 2.8 cm    LVPWd 1.0 cm    IVS/LVPW 1.1     FS 36.7 %    EDV(Teich) 91.1 ml    ESV(Teich) 30.4 ml    EF(Teich) 66.6 %    EDV(cubed) 89.5 ml    ESV(cubed) 22.7 ml    EF(cubed) 74.6 %    LV mass(C)d 159.8 grams    LV mass(C)dI 78.2 grams/m^2    SV(Teich) 60.7 ml    SI(Teich) 29.7 ml/m^2    SV(cubed) 66.7 ml    SI(cubed) 32.7 ml/m^2    LA dimension 3.2 cm    asc Aorta Diam 3.0 cm    LVOT diam 2.0 cm    LVOT area 3.0 cm^2    LVOT area(traced) 3.1 cm^2    LAd major 5.8 cm    LVLd ap4 8.7 cm    EDV(MOD-sp4) 128.0 ml    LVLs ap4 6.7 cm    ESV(MOD-sp4) 62.0 ml    EF(MOD-sp4) 51.6 %    LVLd ap2 8.6 cm    EDV(MOD-sp2) 124.0 ml    LVLs ap2 6.7 cm    ESV(MOD-sp2) 54.0 ml    EF(MOD-sp2) 56.5 %    LA volume 60.0 ml    EF(MOD-bp) 54.0 %    SV(MOD-sp4) 66.0 ml    SI(MOD-sp4) 32.3 ml/m^2    SV(MOD-sp2) 70.0 ml    SI(MOD-sp2) 34.3 ml/m^2    LV Vargas Vol (BSA corrected) 62.7 ml/m^2    LV Sys Vol (BSA corrected) 30.3 ml/m^2    LA Volume Index 29.4 ml/m^2    MV E max cecily 50.5 cm/sec    MV A max cecily 63.1 cm/sec    MV E/A 0.8     MV V2 max 90.7 cm/sec    MV max PG 3.3 mmHg    MV V2 mean 51.7 cm/sec    MV mean PG 1.3 mmHg    MV V2 VTI 26.4 cm    MVA(VTI) 2.0 cm^2    MV dec time 0.35 sec    Ao pk cecily 129.3 cm/sec    Ao max PG 6.7 mmHg    Ao max PG (full) 4.2 mmHg    Ao V2 mean 86.3 cm/sec    Ao mean PG 3.3 mmHg    Ao mean PG (full) 2.1 mmHg    Ao V2 VTI 27.0 cm    MANSOOR(I,A) 1.9 cm^2    MANSOOR(I,D) 1.9 cm^2    MANSOOR(V,A) 1.8 cm^2    MANSOOR(V,D)  1.8 cm^2    AI max jasper 345.4 cm/sec    AI max PG 47.7 mmHg    AI dec slope 232.5 cm/sec^2    AI P1/2t 435.0 msec    LV V1 max PG 2.5 mmHg    LV V1 mean PG 1.3 mmHg    LV V1 max 78.4 cm/sec    LV V1 mean 51.0 cm/sec    LV V1 VTI 17.1 cm    SV(LVOT) 51.5 ml    SI(LVOT) 25.2 ml/m^2    PA V2 max 81.4 cm/sec    PA max PG 2.7 mmHg    PA acc slope 287.5 cm/sec^2    PA acc time 0.14 sec    TR max jasper 249.8 cm/sec     CV ECHO NELLY - TR MAX PG 25.0 mmHg    RVSP(TR) 28.0 mmHg    RAP systole 3.0 mmHg    PA pr(Accel) 17.2 mmHg    Lat E/e'  6.5     Med E/e' 6.8     Lat Peak E' Jasper 7.6 cm/sec    Med Peak E' Jasper 7.2 cm/sec     CV ECHO NELLY - BZI_BMI 23.4 kilograms/m^2     CV ECHO NELLY - BSA(HAYCOCK) 2.0 m^2     CV ECHO NELLY - BZI_METRIC_WEIGHT 80.3 kg     CV ECHO NELLY - BZI_METRIC_HEIGHT 185.4 cm    Avg E/e' ratio 6.82     TDI S' 11.40 cm/sec    RV Base 3.70 cm    RV Length 7.00 cm    RV Mid 3.20 cm    Ascending aorta 3.00 cm    AV vena contracta 0.40 cm    MV vena contracta 0.40 cm    Ao root diam 3.3 cm    TAPSE (>1.6) 2.40 cm2         No results found for: CHOL, HDL, HDLC, LDL, LDLC, VLDL    EKG:  (EKG/Tracing has been independently visualized by me and summarized below)      ECG 12 Lead  Date/Time: 11/5/2019 3:29 PM  Performed by: Adalberto Zuñiga MD  Authorized by: Adalberto Zuñiga MD   Comparison: compared with previous ECG   Similar to previous ECG  Rhythm: sinus rhythm  Rate: normal  BPM: 70  Conduction: right bundle branch block    Clinical impression: abnormal EKG            ASSESSMENT and PLAN  1.  Atrial flutter-Post atrial flutter ablation. Doing well with no recurrent events.  2.  Acute systolic heart failure-recheck of his EF showed that it had normalized post stabilization of his heart rate  3.  Pulmonary hypertension-remeasureed on echo that he had normalized    Return if symptoms worsen or fail to improve.    Follow up with Dr. Luke Zuñiga M.D., F.A.C.C,  PATRICE  Cardiology/Electrophysiology  11/05/19  3:30 PM

## 2019-12-30 RX ORDER — ATORVASTATIN CALCIUM 40 MG/1
TABLET, FILM COATED ORAL
Qty: 30 TABLET | Refills: 6 | Status: SHIPPED | OUTPATIENT
Start: 2019-12-30 | End: 2020-07-08 | Stop reason: SDUPTHER

## 2019-12-30 RX ORDER — LOSARTAN POTASSIUM 50 MG/1
TABLET ORAL
Qty: 30 TABLET | Refills: 6 | Status: SHIPPED | OUTPATIENT
Start: 2019-12-30 | End: 2020-07-08 | Stop reason: SDUPTHER

## 2020-03-12 ENCOUNTER — OFFICE VISIT (OUTPATIENT)
Dept: CARDIOLOGY | Facility: CLINIC | Age: 80
End: 2020-03-12

## 2020-03-12 VITALS
WEIGHT: 195.8 LBS | BODY MASS INDEX: 25.95 KG/M2 | HEIGHT: 73 IN | SYSTOLIC BLOOD PRESSURE: 129 MMHG | HEART RATE: 75 BPM | DIASTOLIC BLOOD PRESSURE: 65 MMHG | OXYGEN SATURATION: 97 %

## 2020-03-12 DIAGNOSIS — I10 ESSENTIAL HYPERTENSION: ICD-10-CM

## 2020-03-12 DIAGNOSIS — R06.02 SHORTNESS OF BREATH: ICD-10-CM

## 2020-03-12 DIAGNOSIS — R00.2 PALPITATIONS: Primary | ICD-10-CM

## 2020-03-12 PROCEDURE — 99203 OFFICE O/P NEW LOW 30 MIN: CPT | Performed by: PHYSICIAN ASSISTANT

## 2020-03-12 NOTE — PROGRESS NOTES
Subjective   Sung Merino is a 80 y.o. male     Chief Complaint   Patient presents with   • Establish Care     Pt here to establish cardiac care   Problem List:  1.  Atrial flutter, status post ablation, 2019.  2.  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  His dilated cardiomyopathy presumably was related to atrial flutter/rapid ventricular response rates.  2.1.  Low normal but preserved systolic function per echocardiogram, 10/19.  3.  Hypertension  4.  Dyslipidemia    HPI  The patient presents today to establish cardiac care.  This gentleman was initially seen through cardiology services after presenting to the local hospital with tacky dysrhythmic activity and acute exacerbation of congestive heart failure.  Work-up at that time suggested rather significant dilated cardiomyopathy.  Apparently, ischemia work-up at that time was benign but we do not have that record today.  This is by verbal report.  He was also noted to have atrial flutter.  This was eventually seen and evaluated through EP services.  And ablation was performed for the atrial flutter.  He reports no significant continued dysrhythmic activity, although he does note palpitations from time to time.  He has had nothing else to suggest atrial flutter however by his report.  After ablation and normalization of rate, an echocardiogram was performed in October 2019 which suggested normalized systolic function.  The patient really has done well since.  He was left on rate control and anticoagulation therapy by document review today.  He was advised to follow-up with general cardiology from that time on to EP services.  Symptomatically, the patient has no chest pain.  The patient has stable dyspnea.  He has no failure or dysrhythmic symptoms.  He is knowledgeable in terms of titration of diuretic regimen.  Routine laboratories are followed with his primary care provider and felt to be normal by patient.  He has no  further complaints otherwise.      Current Outpatient Medications   Medication Sig Dispense Refill   • atorvastatin (LIPITOR) 40 MG tablet TAKE 1 TABLET BY MOUTH EVERY DAY 30 tablet 6   • bumetanide (BUMEX) 1 MG tablet Take 1 tablet by mouth Daily. (Patient taking differently: Take 1 mg by mouth Daily. 2 tabs daily) 30 tablet 3   • ELIQUIS 5 MG tablet tablet Take 5 mg by mouth 2 (Two) Times a Day. Dr. Zuñiga instructed patient to continue  0   • glimepiride (AMARYL) 2 MG tablet Take 2 mg by mouth Daily.  5   • losartan (COZAAR) 50 MG tablet TAKE 1 TABLET BY MOUTH EVERY DAY 30 tablet 6   • metoprolol succinate XL (TOPROL-XL) 100 MG 24 hr tablet Take 1 tablet by mouth Every Morning. 30 tablet 5     No current facility-administered medications for this visit.        Patient has no known allergies.    Past Medical History:   Diagnosis Date   • Abnormal heart rhythm    • Anxiety    • CHF (congestive heart failure) (CMS/HCC)    • Diabetes mellitus (CMS/HCC)    • Easy bruising    • Heart failure (CMS/HCC)    • Hernia, hiatal    • Hyperlipidemia    • Hypertension    • Irregular heart beat    • On home oxygen therapy     2L NC during the day; patient states he does not wear any at night   • Vascular disease    • Wears glasses     reading glasses       Social History     Socioeconomic History   • Marital status:      Spouse name: Not on file   • Number of children: Not on file   • Years of education: Not on file   • Highest education level: Not on file   Tobacco Use   • Smoking status: Former Smoker     Types: Cigarettes     Last attempt to quit: 2013     Years since quittin.4   • Smokeless tobacco: Former User     Types: Chew     Quit date: 2019   Substance and Sexual Activity   • Alcohol use: No     Frequency: Never   • Drug use: No   • Sexual activity: Defer       Family History   Problem Relation Age of Onset   • Heart attack Mother    • Cancer Father        Review of Systems   Constitutional:  "Positive for fatigue (occasional).   HENT: Positive for nosebleeds (occasional nose bleeds). Negative for congestion, rhinorrhea and sore throat.    Eyes: Positive for visual disturbance (reading glasses).   Respiratory: Negative.  Negative for chest tightness, shortness of breath and wheezing.    Cardiovascular: Negative.  Negative for chest pain, palpitations and leg swelling.   Gastrointestinal: Negative.  Negative for abdominal pain, nausea and vomiting.   Endocrine: Negative.  Negative for cold intolerance and heat intolerance.   Genitourinary: Negative.  Negative for difficulty urinating, frequency and urgency.   Musculoskeletal: Negative.  Negative for arthralgias, back pain and neck pain.   Skin: Negative.  Negative for rash and wound.   Allergic/Immunologic: Negative.  Negative for environmental allergies and food allergies.   Neurological: Negative.  Negative for dizziness, syncope and light-headedness.   Hematological: Bruises/bleeds easily (bruises/blds easily).   Psychiatric/Behavioral: Positive for agitation (gets agitated) and confusion (gets confused). Negative for sleep disturbance (denies waking up smothering/SOA). The patient is nervous/anxious (gets nervous/anxious).        Objective     Vitals:    03/12/20 1348   BP: 129/65   Pulse: 75   SpO2: 97%   Weight: 88.8 kg (195 lb 12.8 oz)   Height: 185.4 cm (73\")        /65   Pulse 75   Ht 185.4 cm (73\")   Wt 88.8 kg (195 lb 12.8 oz)   SpO2 97%   BMI 25.83 kg/m²      Lab Results (most recent)     None          Physical Exam   Constitutional: He is oriented to person, place, and time. He appears well-developed and well-nourished. No distress.   HENT:   Head: Normocephalic and atraumatic.   Eyes: Pupils are equal, round, and reactive to light. Conjunctivae and EOM are normal.   Neck: Normal range of motion. Neck supple. No JVD present. No tracheal deviation present.   Cardiovascular: Normal rate, regular rhythm, normal heart sounds and intact " distal pulses.   Pulmonary/Chest: Effort normal and breath sounds normal.   Abdominal: Soft. Bowel sounds are normal. He exhibits no distension and no mass. There is no tenderness. There is no rebound and no guarding.   Musculoskeletal: Normal range of motion. He exhibits no edema, tenderness or deformity.   Neurological: He is alert and oriented to person, place, and time.   Skin: Skin is warm and dry. No rash noted. No erythema. No pallor.   Psychiatric: He has a normal mood and affect. His behavior is normal. Judgment and thought content normal.   Nursing note and vitals reviewed.      Procedure   Procedures         Assessment/Plan      Diagnosis Plan   1. Palpitations  Cardiac Event Monitor   2. Shortness of breath  Cardiac Event Monitor   3. Essential hypertension  Cardiac Event Monitor     1.  We have reviewed the patient's previous cardiac history with him.  His initial cardiac presentation was in the setting of acute exacerbation of congestive heart failure secondary to dilated cardiomyopathy, which was tachycardia induced secondary to atrial flutter with rapid ventricular response rates.  He has now had an ablation for his atrial flutter.  Rates have normalized and systolic function has normalized by his echocardiogram just a few months ago.    2.  We have reviewed consideration for further evaluation and work-up.  I do not feel that repeat ischemia assessment, reported as unremarkable during previous hospitalization, nor echocardiogram performed recently would be warranted at this time.  I would like to however evaluate an event monitor to ensure no recurrent episodes of atrial dysrhythmic activity.  We will schedule that for 2 weeks.    3.  For now, the patient is on appropriate medications.  I will continue that without change.  We will see him routinely through the clinic.  For now, he is very much stable.  He has diuretics which he can titrate at home if needed for exacerbation of failure symptoms.   Otherwise, we will continue to see him on 6-month intervals.  He will call for complications prior to follow-up.             Patient's Body mass index is 25.83 kg/m². BMI is within normal parameters. No follow-up required..           Electronically signed by:

## 2020-03-29 ENCOUNTER — OUTSIDE FACILITY SERVICE (OUTPATIENT)
Dept: CARDIOLOGY | Facility: CLINIC | Age: 80
End: 2020-03-29

## 2020-03-29 PROCEDURE — 93228 REMOTE 30 DAY ECG REV/REPORT: CPT | Performed by: INTERNAL MEDICINE

## 2020-07-08 ENCOUNTER — OFFICE VISIT (OUTPATIENT)
Dept: CARDIOLOGY | Facility: CLINIC | Age: 80
End: 2020-07-08

## 2020-07-08 VITALS
DIASTOLIC BLOOD PRESSURE: 64 MMHG | WEIGHT: 199.4 LBS | HEIGHT: 73 IN | OXYGEN SATURATION: 96 % | BODY MASS INDEX: 26.43 KG/M2 | TEMPERATURE: 99.1 F | SYSTOLIC BLOOD PRESSURE: 156 MMHG | HEART RATE: 69 BPM

## 2020-07-08 DIAGNOSIS — Z86.79 HISTORY OF CARDIOMYOPATHY: ICD-10-CM

## 2020-07-08 DIAGNOSIS — R06.02 SHORTNESS OF BREATH: Primary | ICD-10-CM

## 2020-07-08 DIAGNOSIS — I10 ESSENTIAL HYPERTENSION: ICD-10-CM

## 2020-07-08 DIAGNOSIS — Z86.79 HISTORY OF ATRIAL FLUTTER: ICD-10-CM

## 2020-07-08 PROCEDURE — 99213 OFFICE O/P EST LOW 20 MIN: CPT | Performed by: PHYSICIAN ASSISTANT

## 2020-07-08 RX ORDER — LOSARTAN POTASSIUM 50 MG/1
50 TABLET ORAL DAILY
Qty: 90 TABLET | Refills: 3 | Status: SHIPPED | OUTPATIENT
Start: 2020-07-08 | End: 2021-01-05 | Stop reason: SDUPTHER

## 2020-07-08 RX ORDER — BUMETANIDE 1 MG/1
2 TABLET ORAL DAILY
Qty: 180 TABLET | Refills: 3 | Status: SHIPPED | OUTPATIENT
Start: 2020-07-08 | End: 2021-01-05 | Stop reason: SDUPTHER

## 2020-07-08 RX ORDER — ASPIRIN 81 MG/1
81 TABLET ORAL DAILY
Qty: 90 TABLET | Refills: 3 | Status: SHIPPED | OUTPATIENT
Start: 2020-07-08

## 2020-07-08 RX ORDER — APIXABAN 5 MG/1
5 TABLET, FILM COATED ORAL 2 TIMES DAILY
Qty: 180 TABLET | Refills: 3 | Status: SHIPPED | OUTPATIENT
Start: 2020-07-08 | End: 2021-01-05 | Stop reason: SDUPTHER

## 2020-07-08 RX ORDER — METOPROLOL SUCCINATE 100 MG/1
100 TABLET, EXTENDED RELEASE ORAL EVERY MORNING
Qty: 90 TABLET | Refills: 3 | Status: SHIPPED | OUTPATIENT
Start: 2020-07-08 | End: 2021-01-05 | Stop reason: SDUPTHER

## 2020-07-08 RX ORDER — ASPIRIN 81 MG/1
TABLET ORAL DAILY
COMMUNITY
Start: 2020-06-30 | End: 2020-07-08 | Stop reason: SDUPTHER

## 2020-07-08 RX ORDER — ATORVASTATIN CALCIUM 40 MG/1
40 TABLET, FILM COATED ORAL DAILY
Qty: 90 TABLET | Refills: 3 | Status: SHIPPED | OUTPATIENT
Start: 2020-07-08 | End: 2021-01-05 | Stop reason: SDUPTHER

## 2020-07-08 NOTE — PROGRESS NOTES
Problem list     Subjective   Sung Merino is a 80 y.o. male     Chief Complaint   Patient presents with   • Congestive Heart Failure     presents for monitor f/u   • Cardiomyopathy   • Atrial Flutter   • Shortness of Breath   Problem List:  1.  Atrial flutter, status post ablation, 2019.  2.  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  His dilated cardiomyopathy presumably was related to atrial flutter/rapid ventricular response rates.  2.1.  Low normal but preserved systolic function per echocardiogram, 10/19.  3.  Hypertension  4.  Dyslipidemia       HPI  Patient presents in today for routine evaluation and follow-up.  Since last evaluation, the patient is continued to do fairly well from cardiovascular standpoint.  At this time, the patient reports no chest pain.  He has stable dyspnea and fatigue.  He has had no further episodes of failure symptoms, in particular no evidence of PND, orthopnea, or significant lower extremity edema.  The patient reports no dysrhythmic symptoms since ablation.  He has no dizziness or syncope.  He typically is normotensive when checked at home.  He is tolerating anticoagulation without complication.  He has no further complaints otherwise and feels that he is doing well.    Current Outpatient Medications on File Prior to Visit   Medication Sig Dispense Refill   • glimepiride (AMARYL) 2 MG tablet Take 2 mg by mouth Daily.  5   • [DISCONTINUED] ASPIRIN 81 MG EC tablet Take  by mouth Daily.     • [DISCONTINUED] atorvastatin (LIPITOR) 40 MG tablet TAKE 1 TABLET BY MOUTH EVERY DAY 30 tablet 6   • [DISCONTINUED] bumetanide (BUMEX) 1 MG tablet Take 1 tablet by mouth Daily. (Patient taking differently: Take 1 mg by mouth Daily. 2 tabs daily) 30 tablet 3   • [DISCONTINUED] ELIQUIS 5 MG tablet tablet Take 5 mg by mouth 2 (Two) Times a Day. Dr. Zuñiga instructed patient to continue  0   • [DISCONTINUED] losartan (COZAAR) 50 MG tablet TAKE 1 TABLET BY  MOUTH EVERY DAY 30 tablet 6   • [DISCONTINUED] metoprolol succinate XL (TOPROL-XL) 100 MG 24 hr tablet Take 1 tablet by mouth Every Morning. 30 tablet 5     No current facility-administered medications on file prior to visit.        Patient has no known allergies.    Past Medical History:   Diagnosis Date   • Abnormal heart rhythm    • Anxiety    • CHF (congestive heart failure) (CMS/HCC)    • Diabetes mellitus (CMS/HCC)    • Easy bruising    • Heart failure (CMS/HCC)    • Hernia, hiatal    • Hyperlipidemia    • Hypertension    • Irregular heart beat    • On home oxygen therapy     2L NC during the day; patient states he does not wear any at night   • Vascular disease    • Wears glasses     reading glasses       Social History     Socioeconomic History   • Marital status:      Spouse name: Not on file   • Number of children: Not on file   • Years of education: Not on file   • Highest education level: Not on file   Tobacco Use   • Smoking status: Former Smoker     Types: Cigarettes     Last attempt to quit: 2013     Years since quittin.7   • Smokeless tobacco: Former User     Types: Chew     Quit date: 2019   Substance and Sexual Activity   • Alcohol use: No     Frequency: Never   • Drug use: No   • Sexual activity: Defer       Family History   Problem Relation Age of Onset   • Heart attack Mother    • Cancer Father        Review of Systems   Constitutional: Positive for fatigue. Negative for chills, diaphoresis and fever.   HENT: Negative for hearing loss.    Eyes: Positive for visual disturbance.   Respiratory: Positive for shortness of breath (on exertion). Negative for apnea, cough, chest tightness and wheezing.    Cardiovascular: Negative.  Negative for chest pain, palpitations and leg swelling.   Gastrointestinal: Negative.  Negative for abdominal pain, blood in stool, constipation, nausea and vomiting.   Endocrine: Negative.    Genitourinary: Negative.  Negative for hematuria.  "  Musculoskeletal: Positive for arthralgias and back pain. Negative for myalgias and neck pain.   Skin: Negative.    Allergic/Immunologic: Negative.  Negative for environmental allergies and food allergies.   Neurological: Positive for weakness. Negative for dizziness, syncope, light-headedness, numbness and headaches.   Hematological: Bruises/bleeds easily.   Psychiatric/Behavioral: Negative.  Negative for agitation and sleep disturbance. The patient is not nervous/anxious.        Objective   Vitals:    07/08/20 1317   BP: 156/64   BP Location: Left arm   Patient Position: Sitting   Pulse: 69   Temp: 99.1 °F (37.3 °C)   SpO2: 96%   Weight: 90.4 kg (199 lb 6.4 oz)   Height: 185.4 cm (72.99\")      /64 (BP Location: Left arm, Patient Position: Sitting)   Pulse 69   Temp 99.1 °F (37.3 °C)   Ht 185.4 cm (72.99\")   Wt 90.4 kg (199 lb 6.4 oz)   SpO2 96%   BMI 26.31 kg/m²    Lab Results (most recent)     None        Physical Exam   Constitutional: He is oriented to person, place, and time. He appears well-developed and well-nourished. No distress.   HENT:   Head: Normocephalic and atraumatic.   Eyes: Pupils are equal, round, and reactive to light. Conjunctivae and EOM are normal.   Neck: Normal range of motion. Neck supple. No JVD present. No tracheal deviation present.   Cardiovascular: Normal rate, regular rhythm, normal heart sounds and intact distal pulses.   Pulmonary/Chest: Effort normal and breath sounds normal.   Abdominal: Soft. Bowel sounds are normal. He exhibits no distension and no mass. There is no tenderness. There is no rebound and no guarding.   Musculoskeletal: Normal range of motion. He exhibits no edema, tenderness or deformity.   Neurological: He is alert and oriented to person, place, and time.   Skin: Skin is warm and dry. No rash noted. No erythema. No pallor.   Psychiatric: He has a normal mood and affect. His behavior is normal. Judgment and thought content normal.   Nursing note and " vitals reviewed.        Procedure   Procedures       Assessment/Plan      Diagnosis Plan   1. Shortness of breath     2. Essential hypertension     3. History of atrial flutter     4. History of cardiomyopathy       1.  At this time, the patient appears to be doing very well from general cardiovascular standpoint.  He currently has no symptoms of angina, failure, or dysrhythmias.    2.  Blood pressures appear to be well controlled on current medical regimen.  I will continue antihypertensive therapies without change.  He will continue to monitor blood pressures closely at home and call to us for any issues.    3.  We reviewed all the patient's previous history.  His most recent echocardiogram suggested preserved systolic function with stable parameters otherwise.  We have reviewed that in detail with him as well.  As he is doing well and has had overall improvement in his clinical course, I do not feel anything further is indicated from cardiovascular standpoint.    4.  The patient did request refills on cardiac related medications.  I have sent those to his pharmacy.    5.  We will continue to see this pleasant gentleman on 6-month intervals.  He will call for any complications prior to follow-up.         Sung Merino  reports that he quit smoking about 6 years ago. His smoking use included cigarettes. He quit smokeless tobacco use about a year ago.  His smokeless tobacco use included chew.     Patient's Body mass index is 26.31 kg/m². BMI is within normal parameters. No follow-up required..             Electronically signed by:

## 2021-01-05 ENCOUNTER — OFFICE VISIT (OUTPATIENT)
Dept: CARDIOLOGY | Facility: CLINIC | Age: 81
End: 2021-01-05

## 2021-01-05 VITALS
WEIGHT: 204.4 LBS | OXYGEN SATURATION: 98 % | SYSTOLIC BLOOD PRESSURE: 146 MMHG | TEMPERATURE: 96.1 F | BODY MASS INDEX: 27.09 KG/M2 | HEART RATE: 67 BPM | HEIGHT: 73 IN | DIASTOLIC BLOOD PRESSURE: 69 MMHG

## 2021-01-05 DIAGNOSIS — R06.02 SHORTNESS OF BREATH: Primary | ICD-10-CM

## 2021-01-05 DIAGNOSIS — Z86.79 HISTORY OF ATRIAL FLUTTER: ICD-10-CM

## 2021-01-05 DIAGNOSIS — I10 ESSENTIAL HYPERTENSION: ICD-10-CM

## 2021-01-05 PROCEDURE — 93000 ELECTROCARDIOGRAM COMPLETE: CPT | Performed by: PHYSICIAN ASSISTANT

## 2021-01-05 PROCEDURE — 99213 OFFICE O/P EST LOW 20 MIN: CPT | Performed by: PHYSICIAN ASSISTANT

## 2021-01-05 RX ORDER — BUMETANIDE 1 MG/1
2 TABLET ORAL DAILY
Qty: 180 TABLET | Refills: 3 | Status: SHIPPED | OUTPATIENT
Start: 2021-01-05 | End: 2021-12-27

## 2021-01-05 RX ORDER — LOSARTAN POTASSIUM 50 MG/1
50 TABLET ORAL DAILY
Qty: 90 TABLET | Refills: 3 | Status: SHIPPED | OUTPATIENT
Start: 2021-01-05 | End: 2021-12-27

## 2021-01-05 RX ORDER — METOPROLOL SUCCINATE 100 MG/1
100 TABLET, EXTENDED RELEASE ORAL EVERY MORNING
Qty: 90 TABLET | Refills: 3 | Status: SHIPPED | OUTPATIENT
Start: 2021-01-05 | End: 2021-12-27

## 2021-01-05 RX ORDER — ATORVASTATIN CALCIUM 40 MG/1
40 TABLET, FILM COATED ORAL DAILY
Qty: 90 TABLET | Refills: 3 | Status: SHIPPED | OUTPATIENT
Start: 2021-01-05 | End: 2021-12-27

## 2021-01-05 NOTE — PROGRESS NOTES
Problem list     Subjective   Sung Merino is a 80 y.o. male     Chief Complaint   Patient presents with   • Shortness of Breath     presents for 6 month f/u   • Hypertension   Problem List:  1.  Atrial flutter, status post ablation, 2019.  2.  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  His dilated cardiomyopathy presumably was related to atrial flutter/rapid ventricular response rates.  2.1.  Low normal but preserved systolic function per echocardiogram, 10/19.  3.  Hypertension  4.  Dyslipidemia       HPI  The patient presents into the clinic today for routine evaluation and follow-up.  Since last evaluation here, the patient tells me that he has done well from a general cardiovascular standpoint.  Currently, the patient denies chest pain.  He has stable dyspnea.  He has had no further dysrhythmic symptoms.  Follow-up event monitor after ablation of atrial flutter has indicated no residual atrial dysrhythmic activity.  He follows with EP services in that regard.  The patient denies further symptoms otherwise and feels that he is doing well from general cardiovascular standpoint at this time.    Current Outpatient Medications on File Prior to Visit   Medication Sig Dispense Refill   • ASPIRIN 81 MG EC tablet Take 1 tablet by mouth Daily. 90 tablet 3   • atorvastatin (LIPITOR) 40 MG tablet Take 1 tablet by mouth Daily. 90 tablet 3   • bumetanide (BUMEX) 1 MG tablet Take 2 tablets by mouth Daily. 180 tablet 3   • ELIQUIS 5 MG tablet tablet Take 1 tablet by mouth 2 (Two) Times a Day. Dr. Zuñiga instructed patient to continue 180 tablet 3   • glimepiride (AMARYL) 2 MG tablet Take 2 mg by mouth Daily.  5   • losartan (COZAAR) 50 MG tablet Take 1 tablet by mouth Daily. 90 tablet 3   • metoprolol succinate XL (TOPROL-XL) 100 MG 24 hr tablet Take 1 tablet by mouth Every Morning. 90 tablet 3     No current facility-administered medications on file prior to visit.        Patient has  no known allergies.    Past Medical History:   Diagnosis Date   • Abnormal heart rhythm    • Anxiety    • CHF (congestive heart failure) (CMS/HCC)    • Diabetes mellitus (CMS/HCC)    • Easy bruising    • Heart failure (CMS/HCC)    • Hernia, hiatal    • Hyperlipidemia    • Hypertension    • Irregular heart beat    • On home oxygen therapy     2L NC during the day; patient states he does not wear any at night   • Vascular disease    • Wears glasses     reading glasses       Social History     Socioeconomic History   • Marital status:      Spouse name: Not on file   • Number of children: Not on file   • Years of education: Not on file   • Highest education level: Not on file   Tobacco Use   • Smoking status: Former Smoker     Types: Cigarettes     Quit date: 2013     Years since quittin.2   • Smokeless tobacco: Former User     Types: Chew     Quit date: 2019   Substance and Sexual Activity   • Alcohol use: No     Frequency: Never   • Drug use: No   • Sexual activity: Defer       Family History   Problem Relation Age of Onset   • Heart attack Mother    • Cancer Father        Review of Systems   Constitutional: Positive for fatigue. Negative for chills, diaphoresis and fever.   HENT: Negative.    Eyes: Positive for visual disturbance.   Respiratory: Positive for shortness of breath (with increased activity). Negative for apnea, cough, chest tightness and wheezing.    Cardiovascular: Negative.  Negative for chest pain, palpitations and leg swelling.   Gastrointestinal: Negative.  Negative for abdominal pain, constipation, diarrhea, nausea and vomiting.   Endocrine: Negative.    Genitourinary: Negative.  Negative for hematuria.   Musculoskeletal: Positive for arthralgias and back pain. Negative for myalgias and neck pain.   Skin: Negative.    Allergic/Immunologic: Negative.  Negative for environmental allergies and food allergies.   Neurological: Positive for weakness. Negative for dizziness, syncope,  "light-headedness, numbness and headaches.   Hematological: Bruises/bleeds easily.   Psychiatric/Behavioral: Negative.  Negative for agitation and sleep disturbance. The patient is not nervous/anxious.        Objective   Vitals:    01/05/21 1304   BP: 146/69   BP Location: Left arm   Patient Position: Sitting   Pulse: 67   Temp: 96.1 °F (35.6 °C)   SpO2: 98%   Weight: 92.7 kg (204 lb 6.4 oz)   Height: 185.4 cm (72.99\")      /69 (BP Location: Left arm, Patient Position: Sitting)   Pulse 67   Temp 96.1 °F (35.6 °C)   Ht 185.4 cm (72.99\")   Wt 92.7 kg (204 lb 6.4 oz)   SpO2 98%   BMI 26.97 kg/m²    Lab Results (most recent)     None        Physical Exam  Vitals signs and nursing note reviewed.   Constitutional:       General: He is not in acute distress.     Appearance: He is well-developed.   HENT:      Head: Normocephalic and atraumatic.   Eyes:      Conjunctiva/sclera: Conjunctivae normal.      Pupils: Pupils are equal, round, and reactive to light.   Neck:      Musculoskeletal: Normal range of motion and neck supple.      Vascular: No JVD.      Trachea: No tracheal deviation.   Cardiovascular:      Rate and Rhythm: Normal rate and regular rhythm.      Heart sounds: Normal heart sounds.   Pulmonary:      Effort: Pulmonary effort is normal.      Breath sounds: Normal breath sounds.   Abdominal:      General: Bowel sounds are normal. There is no distension.      Palpations: Abdomen is soft. There is no mass.      Tenderness: There is no abdominal tenderness. There is no guarding or rebound.   Musculoskeletal: Normal range of motion.         General: No tenderness or deformity.   Skin:     General: Skin is warm and dry.      Coloration: Skin is not pale.      Findings: No erythema or rash.   Neurological:      Mental Status: He is alert and oriented to person, place, and time.   Psychiatric:         Behavior: Behavior normal.         Thought Content: Thought content normal.         Judgment: Judgment normal. "           Procedure     ECG 12 Lead    Date/Time: 1/5/2021 1:08 PM  Performed by: Fuad Lugo PA  Authorized by: Fuad Lugo PA   Comparison: compared with previous ECG from 11/5/2019  Comparison to previous ECG: Sinus rhythm at 68, right bundle branch block morphology, normal axis, no acute changes noted.                 Assessment/Plan      Diagnosis Plan   1. Shortness of breath  ECG 12 Lead   2. Essential hypertension  ECG 12 Lead   3. History of atrial flutter       1.  At this time, the patient appears to be doing fairly well from general cardiovascular standpoint.  He has had no further dysrhythmic symptoms after ablation of atrial flutter.  He had no evidence of atrial dysrhythmias otherwise at that time.  He has remained on anticoagulation therapy.  I would defer decision for anticoagulation to the patient's EP provider.    2.  The patient remains normotensive on current antihypertensive regimen.  He will continue to monitor that and call to us immediately for any issues.    3.  Symptomatically, the patient has stable dyspnea.  He denies angina, failure, or dysrhythmic issues otherwise.  I feel no further work-up is warranted at this time.  Eventually, he will need an updated echocardiogram to reevaluate systolic function given history of dilated cardiomyopathy.  Again, that normalized by most recent echocardiogram, all as above.  Nothing further for now.  We will continue to see him on 6-month intervals.           Sung FERGUSON Angelique  reports that he quit smoking about 7 years ago. His smoking use included cigarettes. He quit smokeless tobacco use about 17 months ago.  His smokeless tobacco use included chew..        Patient's Body mass index is 26.97 kg/m². BMI is above normal parameters. Recommendations include: educational material.     Advance Care Planning   ACP discussion was held with the patient during this visit. Patient does not have an advance directive, declines further  assistance.          Electronically signed by:

## 2021-01-05 NOTE — PATIENT INSTRUCTIONS

## 2021-07-06 ENCOUNTER — OFFICE VISIT (OUTPATIENT)
Dept: CARDIOLOGY | Facility: CLINIC | Age: 81
End: 2021-07-06

## 2021-07-06 VITALS
OXYGEN SATURATION: 95 % | HEIGHT: 73 IN | DIASTOLIC BLOOD PRESSURE: 67 MMHG | BODY MASS INDEX: 27.01 KG/M2 | SYSTOLIC BLOOD PRESSURE: 121 MMHG | WEIGHT: 203.8 LBS | HEART RATE: 76 BPM

## 2021-07-06 DIAGNOSIS — Z86.79 HISTORY OF ATRIAL FLUTTER: ICD-10-CM

## 2021-07-06 DIAGNOSIS — I10 ESSENTIAL HYPERTENSION: ICD-10-CM

## 2021-07-06 DIAGNOSIS — R06.02 SHORTNESS OF BREATH: Primary | ICD-10-CM

## 2021-07-06 PROCEDURE — 99213 OFFICE O/P EST LOW 20 MIN: CPT | Performed by: PHYSICIAN ASSISTANT

## 2021-07-06 NOTE — PROGRESS NOTES
Problem list     Subjective   Sung Merino is a 81 y.o. male     Chief Complaint   Patient presents with   • Follow-up     6 month    Problem List:  1.  Atrial flutter, status post ablation, 2019.  2.  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  His dilated cardiomyopathy presumably was related to atrial flutter/rapid ventricular response rates.  2.1.  Low normal but preserved systolic function per echocardiogram, 10/19.  3.  Hypertension  4.  Dyslipidemia    HPI  The patient presents into the clinic today for routine evaluation and follow-up.  The patient is continued to do well from cardiovascular standpoint since last evaluation.  He reports no current chest pain.  He has stable dyspnea.  He has no failure or dysrhythmic symptoms.  He is tolerating current medications without complication.  He feels that he typically is normotensive when checked at home.  The patient has no further complaints otherwise and continues to do well from cardiovascular standpoint.    Current Outpatient Medications on File Prior to Visit   Medication Sig Dispense Refill   • apixaban (Eliquis) 5 MG tablet tablet Take 1 tablet by mouth 2 (Two) Times a Day. Dr. Zuñiga instructed patient to continue 180 tablet 3   • ASPIRIN 81 MG EC tablet Take 1 tablet by mouth Daily. 90 tablet 3   • atorvastatin (LIPITOR) 40 MG tablet Take 1 tablet by mouth Daily. 90 tablet 3   • bumetanide (BUMEX) 1 MG tablet Take 2 tablets by mouth Daily. 180 tablet 3   • losartan (COZAAR) 50 MG tablet Take 1 tablet by mouth Daily. 90 tablet 3   • metoprolol succinate XL (TOPROL-XL) 100 MG 24 hr tablet Take 1 tablet by mouth Every Morning. 90 tablet 3   • glimepiride (AMARYL) 2 MG tablet Take 2 mg by mouth Daily.  5     No current facility-administered medications on file prior to visit.       Patient has no known allergies.    Past Medical History:   Diagnosis Date   • Abnormal heart rhythm    • Anxiety    • CHF (congestive heart  failure) (CMS/[facility])    • Diabetes mellitus (CMS/HCC)    • Easy bruising    • Heart failure (CMS/[facility])    • Hernia, hiatal    • Hyperlipidemia    • Hypertension    • Irregular heart beat    • On home oxygen therapy     2L NC during the day; patient states he does not wear any at night   • Vascular disease    • Wears glasses     reading glasses       Social History     Socioeconomic History   • Marital status:      Spouse name: Not on file   • Number of children: Not on file   • Years of education: Not on file   • Highest education level: Not on file   Tobacco Use   • Smoking status: Former Smoker     Types: Cigarettes     Quit date: 2013     Years since quittin.7   • Smokeless tobacco: Former User     Types: Chew     Quit date: 2019   Substance and Sexual Activity   • Alcohol use: No   • Drug use: No   • Sexual activity: Defer       Family History   Problem Relation Age of Onset   • Heart attack Mother    • Cancer Father        Review of Systems   Constitutional: Negative.  Negative for chills, fatigue and fever.   HENT: Negative.  Negative for congestion, rhinorrhea and sore throat.    Eyes: Positive for visual disturbance (glasses).   Respiratory: Negative.  Negative for chest tightness, shortness of breath and wheezing.    Cardiovascular: Negative.  Negative for chest pain, palpitations and leg swelling.   Gastrointestinal: Negative.    Endocrine: Negative.    Genitourinary: Negative.    Musculoskeletal: Negative.  Negative for arthralgias, back pain and neck pain.   Skin: Negative.  Negative for rash and wound.   Allergic/Immunologic: Negative.  Negative for environmental allergies.   Neurological: Negative.  Negative for dizziness, weakness, numbness and headaches.   Hematological: Bruises/bleeds easily (bruises).   Psychiatric/Behavioral: Negative.  Negative for sleep disturbance.       Objective   Vitals:    21 1318   BP: 121/67   BP Location: Left arm   Patient Position: Sitting  "  Pulse: 76   SpO2: 95%   Weight: 92.4 kg (203 lb 12.8 oz)   Height: 185.4 cm (72.99\")      /67 (BP Location: Left arm, Patient Position: Sitting)   Pulse 76   Ht 185.4 cm (72.99\")   Wt 92.4 kg (203 lb 12.8 oz)   SpO2 95%   BMI 26.90 kg/m²    Lab Results (most recent)     None        Physical Exam  Vitals and nursing note reviewed.   Constitutional:       General: He is not in acute distress.     Appearance: He is well-developed.   HENT:      Head: Normocephalic and atraumatic.   Eyes:      Conjunctiva/sclera: Conjunctivae normal.      Pupils: Pupils are equal, round, and reactive to light.   Neck:      Vascular: No JVD.      Trachea: No tracheal deviation.   Cardiovascular:      Rate and Rhythm: Normal rate and regular rhythm.      Heart sounds: Normal heart sounds.   Pulmonary:      Effort: Pulmonary effort is normal.      Breath sounds: Normal breath sounds.   Abdominal:      General: Bowel sounds are normal. There is no distension.      Palpations: Abdomen is soft. There is no mass.      Tenderness: There is no abdominal tenderness. There is no guarding or rebound.   Musculoskeletal:         General: No tenderness or deformity. Normal range of motion.      Cervical back: Normal range of motion and neck supple.   Skin:     General: Skin is warm and dry.      Coloration: Skin is not pale.      Findings: No erythema or rash.   Neurological:      Mental Status: He is alert and oriented to person, place, and time.   Psychiatric:         Behavior: Behavior normal.         Thought Content: Thought content normal.         Judgment: Judgment normal.           Procedure   Procedures       Assessment/Plan      Diagnosis Plan   1. Shortness of breath     2. Essential hypertension     3. History of atrial flutter       1.  At this time, the patient is doing well clinically from cardiovascular standpoint.  His dyspnea is at baseline.  He has had no further dysrhythmic symptoms or issues since a flutter " ablation.    2.  The patient remains normotensive on current medical regimen.  He will monitor blood pressures and call for any ongoing issues.    3.  As patient is doing well, nothing further.  We will make no adjustments in medications.  We will continue to see him on 6-month intervals.        Advance Care Planning   ACP discussion was held with the patient during this visit. Patient does not have an advance directive, declines further assistance.      Electronically signed by:

## 2021-07-06 NOTE — PATIENT INSTRUCTIONS

## 2021-12-27 RX ORDER — ATORVASTATIN CALCIUM 40 MG/1
40 TABLET, FILM COATED ORAL DAILY
Qty: 90 TABLET | Refills: 3 | Status: SHIPPED | OUTPATIENT
Start: 2021-12-27 | End: 2022-12-21

## 2021-12-27 RX ORDER — METOPROLOL SUCCINATE 100 MG/1
100 TABLET, EXTENDED RELEASE ORAL EVERY MORNING
Qty: 90 TABLET | Refills: 3 | Status: SHIPPED | OUTPATIENT
Start: 2021-12-27 | End: 2022-12-21

## 2021-12-27 RX ORDER — BUMETANIDE 1 MG/1
2 TABLET ORAL DAILY
Qty: 180 TABLET | Refills: 3 | Status: SHIPPED | OUTPATIENT
Start: 2021-12-27 | End: 2022-12-21

## 2021-12-27 RX ORDER — LOSARTAN POTASSIUM 50 MG/1
50 TABLET ORAL DAILY
Qty: 90 TABLET | Refills: 3 | Status: SHIPPED | OUTPATIENT
Start: 2021-12-27 | End: 2022-12-21

## 2022-02-19 DIAGNOSIS — Z86.79 HISTORY OF ATRIAL FLUTTER: Primary | ICD-10-CM

## 2022-02-21 RX ORDER — APIXABAN 5 MG/1
TABLET, FILM COATED ORAL
Qty: 180 TABLET | Refills: 1 | Status: SHIPPED | OUTPATIENT
Start: 2022-02-21 | End: 2022-09-06

## 2022-09-03 DIAGNOSIS — Z86.79 HISTORY OF ATRIAL FLUTTER: ICD-10-CM

## 2022-09-06 RX ORDER — APIXABAN 5 MG/1
TABLET, FILM COATED ORAL
Qty: 60 TABLET | Refills: 1 | Status: SHIPPED | OUTPATIENT
Start: 2022-09-06

## 2022-12-21 RX ORDER — ATORVASTATIN CALCIUM 40 MG/1
40 TABLET, FILM COATED ORAL DAILY
Qty: 30 TABLET | Refills: 0 | Status: SHIPPED | OUTPATIENT
Start: 2022-12-21

## 2022-12-21 RX ORDER — METOPROLOL SUCCINATE 100 MG/1
100 TABLET, EXTENDED RELEASE ORAL EVERY MORNING
Qty: 30 TABLET | Refills: 0 | Status: SHIPPED | OUTPATIENT
Start: 2022-12-21

## 2022-12-21 RX ORDER — BUMETANIDE 1 MG/1
2 TABLET ORAL DAILY
Qty: 60 TABLET | Refills: 0 | Status: SHIPPED | OUTPATIENT
Start: 2022-12-21

## 2022-12-21 RX ORDER — LOSARTAN POTASSIUM 50 MG/1
50 TABLET ORAL DAILY
Qty: 30 TABLET | Refills: 0 | Status: SHIPPED | OUTPATIENT
Start: 2022-12-21

## 2023-02-17 ENCOUNTER — OFFICE VISIT (OUTPATIENT)
Dept: CARDIOLOGY | Facility: CLINIC | Age: 83
End: 2023-02-17
Payer: MEDICARE

## 2023-02-17 VITALS — SYSTOLIC BLOOD PRESSURE: 140 MMHG | DIASTOLIC BLOOD PRESSURE: 64 MMHG | OXYGEN SATURATION: 98 % | HEART RATE: 63 BPM

## 2023-02-17 DIAGNOSIS — R06.02 SHORTNESS OF BREATH: Primary | ICD-10-CM

## 2023-02-17 DIAGNOSIS — Z86.79 HISTORY OF ATRIAL FLUTTER: ICD-10-CM

## 2023-02-17 DIAGNOSIS — E78.5 DYSLIPIDEMIA: ICD-10-CM

## 2023-02-17 DIAGNOSIS — I10 ESSENTIAL HYPERTENSION: ICD-10-CM

## 2023-02-17 PROCEDURE — 99214 OFFICE O/P EST MOD 30 MIN: CPT | Performed by: PHYSICIAN ASSISTANT

## 2023-02-17 PROCEDURE — 93000 ELECTROCARDIOGRAM COMPLETE: CPT | Performed by: PHYSICIAN ASSISTANT

## 2023-02-17 NOTE — PROGRESS NOTES
Problem list     Subjective   Sung Merino is a 83 y.o. male     Chief Complaint   Patient presents with   • Follow-up     Shortness of breath   Problem List:  1.  Atrial flutter, status post ablation, 2019.  2.  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  His dilated cardiomyopathy presumably was related to atrial flutter/rapid ventricular response rates.  2.1.  Low normal but preserved systolic function per echocardiogram, 10/19.  3.  Hypertension  4.  Dyslipidemia    HPI  The patient presents in the clinic today for routine evaluation and follow-up.  Mostly, the patient is done well since last evaluation.  He has had no dysrhythmic symptoms.  Since a flutter ablation, he really has done very well in that regard.  We discussed consideration for discontinuing anticoagulation therapy as he is post ablation.  He is doing well and feels well on the same.  As he is doing well, we have opted to make no adjustments.  He was continued on that by his EP provider, of note.  Otherwise, the patient has stable dyspnea.  He has no chest pain.  He has no dizziness or syncope.  He denies failure symptoms.  He has no further complaints.    Current Outpatient Medications on File Prior to Visit   Medication Sig Dispense Refill   • ASPIRIN 81 MG EC tablet Take 1 tablet by mouth Daily. 90 tablet 3   • atorvastatin (LIPITOR) 40 MG tablet TAKE 1 TABLET BY MOUTH DAILY 30 tablet 0   • bumetanide (BUMEX) 1 MG tablet TAKE 2 TABLETS BY MOUTH DAILY 60 tablet 0   • Eliquis 5 MG tablet tablet TAKE 1 TABLET BY MOUTH TWICE DAILY 60 tablet 1   • losartan (COZAAR) 50 MG tablet TAKE 1 TABLET BY MOUTH DAILY 30 tablet 0   • metoprolol succinate XL (TOPROL-XL) 100 MG 24 hr tablet TAKE 1 TABLET BY MOUTH EVERY MORNING 30 tablet 0   • [DISCONTINUED] glimepiride (AMARYL) 2 MG tablet Take 2 mg by mouth Daily.  5     No current facility-administered medications on file prior to visit.       Patient has no known  allergies.    Past Medical History:   Diagnosis Date   • Abnormal heart rhythm    • Anxiety    • CHF (congestive heart failure) (HCC)    • Diabetes mellitus (HCC)    • Easy bruising    • Heart failure (HCC)    • Hernia, hiatal    • Hyperlipidemia    • Hypertension    • Irregular heart beat    • On home oxygen therapy     2L NC during the day; patient states he does not wear any at night   • Vascular disease    • Wears glasses     reading glasses       Social History     Socioeconomic History   • Marital status:    Tobacco Use   • Smoking status: Former     Types: Cigarettes     Quit date: 2013     Years since quittin.4   • Smokeless tobacco: Former     Types: Chew     Quit date: 2019   Substance and Sexual Activity   • Alcohol use: No   • Drug use: No   • Sexual activity: Defer       Family History   Problem Relation Age of Onset   • Heart attack Mother    • Cancer Father        Review of Systems   Constitutional: Negative.  Negative for activity change, appetite change, chills, fatigue and fever.   HENT: Negative.  Negative for congestion.    Eyes: Positive for visual disturbance.   Respiratory: Negative.  Negative for apnea, cough, chest tightness, shortness of breath and wheezing.    Cardiovascular: Negative.  Negative for chest pain, palpitations and leg swelling.   Gastrointestinal: Negative.  Negative for blood in stool.   Endocrine: Negative.  Negative for cold intolerance and heat intolerance.   Genitourinary: Negative.  Negative for hematuria.   Musculoskeletal: Negative.  Negative for arthralgias, back pain, gait problem, joint swelling, neck pain and neck stiffness.   Skin: Negative.  Negative for color change, rash and wound.   Allergic/Immunologic: Negative.  Negative for environmental allergies and food allergies.   Neurological: Negative.  Negative for dizziness, syncope, weakness, light-headedness, numbness and headaches.   Hematological: Bruises/bleeds easily.    Psychiatric/Behavioral: Negative.  Negative for sleep disturbance.       Objective   Vitals:    02/17/23 1039   BP: 140/64   BP Location: Left arm   Patient Position: Sitting   Cuff Size: Adult   Pulse: 63   SpO2: 98%      /64 (BP Location: Left arm, Patient Position: Sitting, Cuff Size: Adult)   Pulse 63   SpO2 98%    Lab Results (most recent)     None        Physical Exam  Vitals and nursing note reviewed.   Constitutional:       General: He is not in acute distress.     Appearance: He is well-developed.   HENT:      Head: Normocephalic and atraumatic.   Eyes:      Conjunctiva/sclera: Conjunctivae normal.      Pupils: Pupils are equal, round, and reactive to light.   Neck:      Vascular: No JVD.      Trachea: No tracheal deviation.   Cardiovascular:      Rate and Rhythm: Normal rate and regular rhythm.      Heart sounds: Normal heart sounds.   Pulmonary:      Effort: Pulmonary effort is normal.      Breath sounds: Normal breath sounds.   Abdominal:      General: Bowel sounds are normal. There is no distension.      Palpations: Abdomen is soft. There is no mass.      Tenderness: There is no abdominal tenderness. There is no guarding or rebound.   Musculoskeletal:         General: No tenderness or deformity. Normal range of motion.      Cervical back: Normal range of motion and neck supple.   Skin:     General: Skin is warm and dry.      Coloration: Skin is not pale.      Findings: No erythema or rash.   Neurological:      Mental Status: He is alert and oriented to person, place, and time.   Psychiatric:         Behavior: Behavior normal.         Thought Content: Thought content normal.         Judgment: Judgment normal.           Procedure     ECG 12 Lead    Date/Time: 2/17/2023 10:40 AM  Performed by: Fuad Lugo PA  Authorized by: Fuad Lugo PA   Comparison: not compared with previous ECG   Comments: Sinus rhythm, rate 64, right bundle branch block morphology, normal axis, no acute changes  noted.  PVC is noted.               Assessment & Plan      Diagnosis Plan   1. Shortness of breath  Adult Transthoracic Echo Complete W/ Cont if Necessary Per Protocol    CBC & Differential    Comprehensive Metabolic Panel    Lipid Panel    TSH      2. Essential hypertension  Adult Transthoracic Echo Complete W/ Cont if Necessary Per Protocol    CBC & Differential    Comprehensive Metabolic Panel    Lipid Panel    TSH      3. History of atrial flutter  Adult Transthoracic Echo Complete W/ Cont if Necessary Per Protocol    CBC & Differential    Comprehensive Metabolic Panel    Lipid Panel    TSH      4. Dyslipidemia  Adult Transthoracic Echo Complete W/ Cont if Necessary Per Protocol    CBC & Differential    Comprehensive Metabolic Panel    Lipid Panel    TSH      1.  I would like to repeat an echo.  He has history of dilated cardiomyopathy, likely tachycardia induced.  Last EF was within normal limits.  I would like to repeat the same to reevaluate LV size and function as well as cardiac parameters otherwise.    2.  We will repeat routine laboratories, all as outlined above.    3.  I would continue medications for now without change.    4.  We will see him back to review study results.  If unremarkable, in particular if echo supports preserved systolic function, we would resume yearly follow-ups on this gentleman.           Patient brought in medicine bottles to appointment, they have been gone through with the patient. Med list was updated in the chart.     Advance Care Planning   ACP discussion was declined by the patient. Patient does not have an advance directive, declines further assistance.             Electronically signed by:

## 2023-03-09 ENCOUNTER — LAB (OUTPATIENT)
Dept: LAB | Facility: HOSPITAL | Age: 83
End: 2023-03-09
Payer: MEDICARE

## 2023-03-09 ENCOUNTER — HOSPITAL ENCOUNTER (OUTPATIENT)
Dept: CARDIOLOGY | Facility: HOSPITAL | Age: 83
Discharge: HOME | End: 2023-03-09
Payer: MEDICARE

## 2023-03-09 DIAGNOSIS — E78.5 DYSLIPIDEMIA: ICD-10-CM

## 2023-03-09 DIAGNOSIS — R06.02 SHORTNESS OF BREATH: ICD-10-CM

## 2023-03-09 DIAGNOSIS — Z86.79 HISTORY OF ATRIAL FLUTTER: ICD-10-CM

## 2023-03-09 DIAGNOSIS — I10 ESSENTIAL HYPERTENSION: ICD-10-CM

## 2023-03-09 LAB
ALBUMIN SERPL-MCNC: 4.2 G/DL (ref 3.5–5.2)
ALBUMIN/GLOB SERPL: 1.2 G/DL
ALP SERPL-CCNC: 91 U/L (ref 39–117)
ALT SERPL W P-5'-P-CCNC: 13 U/L (ref 1–41)
ANION GAP SERPL CALCULATED.3IONS-SCNC: 10.9 MMOL/L (ref 5–15)
AST SERPL-CCNC: 20 U/L (ref 1–40)
BASOPHILS # BLD AUTO: 0.02 10*3/MM3 (ref 0–0.2)
BASOPHILS NFR BLD AUTO: 0.3 % (ref 0–1.5)
BILIRUB SERPL-MCNC: 0.6 MG/DL (ref 0–1.2)
BUN SERPL-MCNC: 9 MG/DL (ref 8–23)
BUN/CREAT SERPL: 10.8 (ref 7–25)
CALCIUM SPEC-SCNC: 9.5 MG/DL (ref 8.6–10.5)
CHLORIDE SERPL-SCNC: 99 MMOL/L (ref 98–107)
CHOLEST SERPL-MCNC: 163 MG/DL (ref 0–200)
CO2 SERPL-SCNC: 31.1 MMOL/L (ref 22–29)
CREAT SERPL-MCNC: 0.83 MG/DL (ref 0.76–1.27)
DEPRECATED RDW RBC AUTO: 44.7 FL (ref 37–54)
EGFRCR SERPLBLD CKD-EPI 2021: 86.8 ML/MIN/1.73
EOSINOPHIL # BLD AUTO: 0.07 10*3/MM3 (ref 0–0.4)
EOSINOPHIL NFR BLD AUTO: 1.2 % (ref 0.3–6.2)
ERYTHROCYTE [DISTWIDTH] IN BLOOD BY AUTOMATED COUNT: 13.2 % (ref 12.3–15.4)
GLOBULIN UR ELPH-MCNC: 3.5 GM/DL
GLUCOSE SERPL-MCNC: 157 MG/DL (ref 65–99)
HCT VFR BLD AUTO: 39.1 % (ref 37.5–51)
HDLC SERPL-MCNC: 56 MG/DL (ref 40–60)
HGB BLD-MCNC: 12.1 G/DL (ref 13–17.7)
IMM GRANULOCYTES # BLD AUTO: 0.05 10*3/MM3 (ref 0–0.05)
IMM GRANULOCYTES NFR BLD AUTO: 0.8 % (ref 0–0.5)
LDLC SERPL CALC-MCNC: 79 MG/DL (ref 0–100)
LDLC/HDLC SERPL: 1.32 {RATIO}
LYMPHOCYTES # BLD AUTO: 2.23 10*3/MM3 (ref 0.7–3.1)
LYMPHOCYTES NFR BLD AUTO: 37.8 % (ref 19.6–45.3)
MCH RBC QN AUTO: 28.3 PG (ref 26.6–33)
MCHC RBC AUTO-ENTMCNC: 30.9 G/DL (ref 31.5–35.7)
MCV RBC AUTO: 91.4 FL (ref 79–97)
MONOCYTES # BLD AUTO: 0.67 10*3/MM3 (ref 0.1–0.9)
MONOCYTES NFR BLD AUTO: 11.4 % (ref 5–12)
NEUTROPHILS NFR BLD AUTO: 2.86 10*3/MM3 (ref 1.7–7)
NEUTROPHILS NFR BLD AUTO: 48.5 % (ref 42.7–76)
NRBC BLD AUTO-RTO: 0 /100 WBC (ref 0–0.2)
PLATELET # BLD AUTO: 118 10*3/MM3 (ref 140–450)
PMV BLD AUTO: 12.1 FL (ref 6–12)
POTASSIUM SERPL-SCNC: 4.2 MMOL/L (ref 3.5–5.2)
PROT SERPL-MCNC: 7.7 G/DL (ref 6–8.5)
RBC # BLD AUTO: 4.28 10*6/MM3 (ref 4.14–5.8)
SODIUM SERPL-SCNC: 141 MMOL/L (ref 136–145)
TRIGL SERPL-MCNC: 166 MG/DL (ref 0–150)
TSH SERPL DL<=0.05 MIU/L-ACNC: 2.5 UIU/ML (ref 0.27–4.2)
VLDLC SERPL-MCNC: 28 MG/DL (ref 5–40)
WBC NRBC COR # BLD: 5.9 10*3/MM3 (ref 3.4–10.8)

## 2023-03-09 PROCEDURE — 93306 TTE W/DOPPLER COMPLETE: CPT | Performed by: SPECIALIST

## 2023-03-09 PROCEDURE — 80061 LIPID PANEL: CPT | Performed by: PHYSICIAN ASSISTANT

## 2023-03-09 PROCEDURE — 80053 COMPREHEN METABOLIC PANEL: CPT | Performed by: PHYSICIAN ASSISTANT

## 2023-03-09 PROCEDURE — 93306 TTE W/DOPPLER COMPLETE: CPT

## 2023-03-09 PROCEDURE — 84443 ASSAY THYROID STIM HORMONE: CPT | Performed by: PHYSICIAN ASSISTANT

## 2023-03-09 PROCEDURE — 85025 COMPLETE CBC W/AUTO DIFF WBC: CPT | Performed by: PHYSICIAN ASSISTANT

## 2023-03-13 ENCOUNTER — TELEPHONE (OUTPATIENT)
Dept: CARDIOLOGY | Facility: CLINIC | Age: 83
End: 2023-03-13
Payer: MEDICARE

## 2023-03-13 NOTE — TELEPHONE ENCOUNTER
----- Message from ALBERTO Archer sent at 3/10/2023  1:59 PM EST -----  Glucose elevated.  Lipids mostly controlled.  CBC unremarkable although there is borderline anemia.  TSH within normal limits  ----- Message -----  From: Lab, Background User  Sent: 3/9/2023   2:56 PM EST  To: ALBERTO Archer

## 2023-04-10 LAB
AORTIC DIMENSIONLESS INDEX: 0.8 (DI)
BH CV ECHO MEAS - ACS: 2 CM
BH CV ECHO MEAS - AI P1/2T: 605.9 MSEC
BH CV ECHO MEAS - AO MAX PG: 3.3 MMHG
BH CV ECHO MEAS - AO MEAN PG: 1.77 MMHG
BH CV ECHO MEAS - AO ROOT DIAM: 3.1 CM
BH CV ECHO MEAS - AO V2 MAX: 90.8 CM/SEC
BH CV ECHO MEAS - AO V2 VTI: 22.2 CM
BH CV ECHO MEAS - AVA(I,D): 3.7 CM2
BH CV ECHO MEAS - EDV(CUBED): 127.5 ML
BH CV ECHO MEAS - EDV(MOD-SP4): 47.7 ML
BH CV ECHO MEAS - EF(MOD-SP4): 52.4 %
BH CV ECHO MEAS - ESV(CUBED): 46.3 ML
BH CV ECHO MEAS - ESV(MOD-SP4): 22.7 ML
BH CV ECHO MEAS - FS: 28.7 %
BH CV ECHO MEAS - IVS/LVPW: 0.88 CM
BH CV ECHO MEAS - IVSD: 1.16 CM
BH CV ECHO MEAS - LA A2CS (ATRIAL LENGTH): 4.9 CM
BH CV ECHO MEAS - LA A4C LENGTH: 4.9 CM
BH CV ECHO MEAS - LA DIMENSION: 3.8 CM
BH CV ECHO MEAS - LAT PEAK E' VEL: 7.8 CM/SEC
BH CV ECHO MEAS - LV DIASTOLIC VOL/BSA (35-75): 22 CM2
BH CV ECHO MEAS - LV MASS(C)D: 247.8 GRAMS
BH CV ECHO MEAS - LV MAX PG: 2.16 MMHG
BH CV ECHO MEAS - LV MEAN PG: 0.92 MMHG
BH CV ECHO MEAS - LV SYSTOLIC VOL/BSA (12-30): 10.5 CM2
BH CV ECHO MEAS - LV V1 MAX: 73.5 CM/SEC
BH CV ECHO MEAS - LV V1 VTI: 21.3 CM
BH CV ECHO MEAS - LVIDD: 5 CM
BH CV ECHO MEAS - LVIDS: 3.6 CM
BH CV ECHO MEAS - LVOT AREA: 3.9 CM2
BH CV ECHO MEAS - LVOT DIAM: 2.21 CM
BH CV ECHO MEAS - LVPWD: 1.32 CM
BH CV ECHO MEAS - MED PEAK E' VEL: 5.1 CM/SEC
BH CV ECHO MEAS - MV A MAX VEL: 66.8 CM/SEC
BH CV ECHO MEAS - MV DEC SLOPE: 345.6 CM/SEC2
BH CV ECHO MEAS - MV DEC TIME: 0.22 MSEC
BH CV ECHO MEAS - MV E MAX VEL: 74.4 CM/SEC
BH CV ECHO MEAS - MV E/A: 1.11
BH CV ECHO MEAS - MV MAX PG: 2.6 MMHG
BH CV ECHO MEAS - MV MEAN PG: 1.3 MMHG
BH CV ECHO MEAS - MV P1/2T: 61.9 MSEC
BH CV ECHO MEAS - MV V2 VTI: 24.6 CM
BH CV ECHO MEAS - MVA(P1/2T): 3.6 CM2
BH CV ECHO MEAS - MVA(VTI): 3.3 CM2
BH CV ECHO MEAS - PI END-D VEL: 85 CM/SEC
BH CV ECHO MEAS - RAP SYSTOLE: 10 MMHG
BH CV ECHO MEAS - RVDD: 2.8 CM
BH CV ECHO MEAS - RVSP: 33.5 MMHG
BH CV ECHO MEAS - SI(MOD-SP4): 11.5 ML/M2
BH CV ECHO MEAS - SV(LVOT): 81.9 ML
BH CV ECHO MEAS - SV(MOD-SP4): 25 ML
BH CV ECHO MEAS - TR MAX PG: 23.5 MMHG
BH CV ECHO MEAS - TR MAX VEL: 242.6 CM/SEC
BH CV ECHO MEASUREMENTS AVERAGE E/E' RATIO: 11.53
LEFT ATRIUM VOLUME INDEX: 20.1 ML/M2
LEFT ATRIUM VOLUME: 43 ML
MAXIMAL PREDICTED HEART RATE: 137 BPM
STRESS TARGET HR: 116 BPM

## 2023-04-12 ENCOUNTER — TELEPHONE (OUTPATIENT)
Dept: CARDIOLOGY | Facility: CLINIC | Age: 83
End: 2023-04-12
Payer: MEDICARE

## 2023-04-12 NOTE — TELEPHONE ENCOUNTER
ECHO  Pt notified of no acute findings. Provider will discuss results at f/u. Pt reminded of appt date and time.  ----- Message from Maren Nunes MA sent at 4/12/2023  9:00 AM EDT -----    ----- Message -----  From: Fuad Lugo PA  Sent: 4/12/2023   8:56 AM EDT  To: Maren Nunes MA    Routine follow-up.  ----- Message -----  From: Andrews Butler MD  Sent: 4/10/2023   1:58 PM EDT  To: ALBERTO Archer

## 2023-11-14 ENCOUNTER — OFFICE VISIT (OUTPATIENT)
Dept: CARDIOLOGY | Facility: CLINIC | Age: 83
End: 2023-11-14
Payer: MEDICARE

## 2023-11-14 VITALS
BODY MASS INDEX: 24.65 KG/M2 | HEIGHT: 73 IN | SYSTOLIC BLOOD PRESSURE: 117 MMHG | HEART RATE: 77 BPM | WEIGHT: 186 LBS | DIASTOLIC BLOOD PRESSURE: 64 MMHG | OXYGEN SATURATION: 94 %

## 2023-11-14 DIAGNOSIS — I10 ESSENTIAL HYPERTENSION: ICD-10-CM

## 2023-11-14 DIAGNOSIS — R06.02 SHORTNESS OF BREATH: Primary | ICD-10-CM

## 2023-11-14 DIAGNOSIS — Z86.79 HISTORY OF ATRIAL FLUTTER: ICD-10-CM

## 2023-11-14 PROCEDURE — 1159F MED LIST DOCD IN RCRD: CPT | Performed by: PHYSICIAN ASSISTANT

## 2023-11-14 PROCEDURE — 99213 OFFICE O/P EST LOW 20 MIN: CPT | Performed by: PHYSICIAN ASSISTANT

## 2023-11-14 PROCEDURE — 1160F RVW MEDS BY RX/DR IN RCRD: CPT | Performed by: PHYSICIAN ASSISTANT

## 2023-11-14 RX ORDER — METOPROLOL SUCCINATE 50 MG/1
50 TABLET, EXTENDED RELEASE ORAL DAILY
COMMUNITY
Start: 2023-11-13

## 2023-11-14 NOTE — PROGRESS NOTES
Problem list     Subjective   Sung Merino is a 83 y.o. male     Chief Complaint   Patient presents with    Follow-up     9 month follow up - Echo results   Problem List:  1.  Atrial flutter, status post ablation, 2019.  2.  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  His dilated cardiomyopathy presumably was related to atrial flutter/rapid ventricular response rates.  2.1.  Low normal but preserved systolic function per echocardiogram, 10/19.  2.2.  Repeat echocardiogram, 4/2023, suggesting EF of 55 to 60%.  3.  Hypertension  4.  Dyslipidemia    HPI  The patient presents in the clinic today for follow-up.  He did have an echocardiogram in April.  This was to reevaluate systolic function.  Again EF is noted to be at 55 to 60%.  Cardiac structure, parameters, and valvular morphology is otherwise were very much stable and unremarkable.  Clinically, the patient is doing well.  He denies failure symptoms.  He has no dysrhythmic symptoms, reporting none since ablation.  He denies chest pain or anginal equivalent symptoms otherwise.  He continues current medical regimen as below.  He does very well on this regimen without complication.  He has no further complaints at this time.  He did have laboratories performed after last evaluation.  TSH was within normal limits.  Chemistry panel suggested hyperglycemia, glucose 157.  Parameters otherwise were within normal limits for the most part.  Lipid profile was performed and indicated a total cholesterol 163, triglycerides 166, HDL 56, and LDL 79.  CBC findings suggested borderline anemia, but nothing of significance.  Parameters otherwise were within normal limits.    Current Outpatient Medications on File Prior to Visit   Medication Sig Dispense Refill    atorvastatin (LIPITOR) 40 MG tablet TAKE 1 TABLET BY MOUTH DAILY 30 tablet 0    Eliquis 5 MG tablet tablet TAKE 1 TABLET BY MOUTH TWICE DAILY 60 tablet 1    metoprolol succinate XL  (TOPROL-XL) 50 MG 24 hr tablet Take 1 tablet by mouth Daily.      [DISCONTINUED] ASPIRIN 81 MG EC tablet Take 1 tablet by mouth Daily. 90 tablet 3    [DISCONTINUED] bumetanide (BUMEX) 1 MG tablet TAKE 2 TABLETS BY MOUTH DAILY 60 tablet 0    [DISCONTINUED] losartan (COZAAR) 50 MG tablet TAKE 1 TABLET BY MOUTH DAILY 30 tablet 0    [DISCONTINUED] metoprolol succinate XL (TOPROL-XL) 100 MG 24 hr tablet TAKE 1 TABLET BY MOUTH EVERY MORNING 30 tablet 0     No current facility-administered medications on file prior to visit.       Patient has no known allergies.    Past Medical History:   Diagnosis Date    Abnormal heart rhythm     Anxiety     CHF (congestive heart failure)     Diabetes mellitus     Easy bruising     Heart failure     Hernia, hiatal     Hyperlipidemia     Hypertension     Irregular heart beat     On home oxygen therapy     2L NC during the day; patient states he does not wear any at night    Vascular disease     Wears glasses     reading glasses       Social History     Socioeconomic History    Marital status:    Tobacco Use    Smoking status: Former     Types: Cigarettes     Quit date: 9/24/2013     Years since quitting: 10.1    Smokeless tobacco: Former     Types: Chew     Quit date: 7/24/2019   Substance and Sexual Activity    Alcohol use: No    Drug use: No    Sexual activity: Defer       Family History   Problem Relation Age of Onset    Heart attack Mother     Cancer Father        Review of Systems   Constitutional: Negative.  Negative for chills, diaphoresis, fatigue and fever.   HENT: Negative.     Eyes: Negative.  Negative for visual disturbance.   Respiratory: Negative.  Negative for apnea, cough, chest tightness, shortness of breath and wheezing.    Cardiovascular: Negative.  Negative for chest pain, palpitations and leg swelling.   Gastrointestinal: Negative.  Negative for abdominal pain and blood in stool.   Endocrine: Negative.    Genitourinary: Negative.  Negative for hematuria.  "  Musculoskeletal: Negative.  Negative for arthralgias, back pain, myalgias, neck pain and neck stiffness.   Skin: Negative.  Negative for rash and wound.   Allergic/Immunologic: Negative.  Negative for environmental allergies and food allergies.   Neurological: Negative.  Negative for dizziness, syncope, weakness, light-headedness, numbness and headaches.   Hematological:  Bruises/bleeds easily.   Psychiatric/Behavioral: Negative.  Negative for sleep disturbance.        Objective   Vitals:    11/14/23 1018   BP: 117/64   BP Location: Left arm   Patient Position: Sitting   Pulse: 77   SpO2: 94%   Weight: 84.4 kg (186 lb)   Height: 185.4 cm (73\")      /64 (BP Location: Left arm, Patient Position: Sitting)   Pulse 77   Ht 185.4 cm (73\")   Wt 84.4 kg (186 lb)   SpO2 94%   BMI 24.54 kg/m²    Lab Results (most recent)       None          Physical Exam  Vitals and nursing note reviewed.   Constitutional:       General: He is not in acute distress.     Appearance: He is well-developed.   HENT:      Head: Normocephalic and atraumatic.   Eyes:      Conjunctiva/sclera: Conjunctivae normal.      Pupils: Pupils are equal, round, and reactive to light.   Neck:      Vascular: No JVD.      Trachea: No tracheal deviation.   Cardiovascular:      Rate and Rhythm: Normal rate and regular rhythm.      Heart sounds: Normal heart sounds.   Pulmonary:      Effort: Pulmonary effort is normal.      Breath sounds: Normal breath sounds.   Abdominal:      General: Bowel sounds are normal. There is no distension.      Palpations: Abdomen is soft. There is no mass.      Tenderness: There is no abdominal tenderness. There is no guarding or rebound.   Musculoskeletal:         General: No tenderness or deformity. Normal range of motion.      Cervical back: Normal range of motion and neck supple.   Skin:     General: Skin is warm and dry.      Coloration: Skin is not pale.      Findings: No erythema or rash.   Neurological:      Mental " Status: He is alert and oriented to person, place, and time.   Psychiatric:         Behavior: Behavior normal.         Thought Content: Thought content normal.         Judgment: Judgment normal.           Procedure   Procedures       Assessment & Plan      Diagnosis Plan   1. Shortness of breath        2. History of atrial flutter        3. Essential hypertension          1.  At this time, the patient appears to be doing well.  Dyspnea is at baseline.  He has had no further dysrhythmic symptoms.  He denies failure nor anginal equivalent issues.    2.  Since ablation, the patient has had no more symptoms of atrial dysrhythmias.  His EP provider opted to leave him on anticoagulation.  We will follow along in that regard.    3.  Blood pressures appear fairly well controlled.  I would make no adjustments in medications.  He will monitor this closely at home.    4.  Is a patient is doing well, nothing further.  We will continue medications without change.  Of note, we did review echo findings.  This again indicates normal systolic function at this time.  He had no significant structural nor valvular abnormalities otherwise.  Nothing further is indicated.  We will continue to see him now in 6 to 9-month intervals.           Sung Merino  reports that he quit smoking about 10 years ago. His smoking use included cigarettes. He quit smokeless tobacco use about 4 years ago.  His smokeless tobacco use included chew.. I have educated him on the risk of diseases from using tobacco products such as cancer, COPD, and heart disease.     I advised him to quit and he is not willing to quit.    I spent 3  minutes counseling the patient.                     Electronically signed by:

## 2024-02-19 RX ORDER — ATORVASTATIN CALCIUM 40 MG/1
40 TABLET, FILM COATED ORAL DAILY
Qty: 90 TABLET | OUTPATIENT
Start: 2024-02-19

## 2024-02-19 RX ORDER — ATORVASTATIN CALCIUM 40 MG/1
40 TABLET, FILM COATED ORAL DAILY
Qty: 30 TABLET | Refills: 0 | Status: SHIPPED | OUTPATIENT
Start: 2024-02-19

## 2024-03-22 RX ORDER — ATORVASTATIN CALCIUM 40 MG/1
40 TABLET, FILM COATED ORAL DAILY
Qty: 30 TABLET | Refills: 0 | Status: SHIPPED | OUTPATIENT
Start: 2024-03-22

## 2024-06-06 RX ORDER — ATORVASTATIN CALCIUM 40 MG/1
40 TABLET, FILM COATED ORAL DAILY
Qty: 30 TABLET | Refills: 2 | Status: SHIPPED | OUTPATIENT
Start: 2024-06-06

## 2024-08-14 ENCOUNTER — OFFICE VISIT (OUTPATIENT)
Dept: CARDIOLOGY | Facility: CLINIC | Age: 84
End: 2024-08-14
Payer: MEDICARE

## 2024-08-14 VITALS
OXYGEN SATURATION: 96 % | BODY MASS INDEX: 25.45 KG/M2 | SYSTOLIC BLOOD PRESSURE: 117 MMHG | WEIGHT: 192 LBS | HEIGHT: 73 IN | DIASTOLIC BLOOD PRESSURE: 56 MMHG | HEART RATE: 80 BPM

## 2024-08-14 DIAGNOSIS — R06.02 SHORTNESS OF BREATH: Primary | ICD-10-CM

## 2024-08-14 DIAGNOSIS — Z86.79 HISTORY OF ATRIAL FLUTTER: ICD-10-CM

## 2024-08-14 DIAGNOSIS — I10 ESSENTIAL HYPERTENSION: ICD-10-CM

## 2024-08-14 PROCEDURE — 93000 ELECTROCARDIOGRAM COMPLETE: CPT | Performed by: PHYSICIAN ASSISTANT

## 2024-08-14 PROCEDURE — 1159F MED LIST DOCD IN RCRD: CPT | Performed by: PHYSICIAN ASSISTANT

## 2024-08-14 PROCEDURE — 99213 OFFICE O/P EST LOW 20 MIN: CPT | Performed by: PHYSICIAN ASSISTANT

## 2024-08-14 PROCEDURE — 1160F RVW MEDS BY RX/DR IN RCRD: CPT | Performed by: PHYSICIAN ASSISTANT

## 2024-08-14 RX ORDER — ASPIRIN 81 MG/1
81 TABLET ORAL DAILY
COMMUNITY

## 2024-08-14 NOTE — PROGRESS NOTES
Problem list     Subjective   Sung Merino is a 84 y.o. male     Chief Complaint   Patient presents with    Follow-up     9 month follow up     Problem List:  1.  Atrial flutter, status post ablation, 2019.  2.  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  His dilated cardiomyopathy presumably was related to atrial flutter/rapid ventricular response rates.  2.1.  Low normal but preserved systolic function per echocardiogram, 10/19.  2.2.  Repeat echocardiogram, 4/2023, suggesting EF of 55 to 60%.  3.  Hypertension  4.  Dyslipidemia    HPI  The patient presents in clinic today for routine evaluation and follow-up.  He continues to do well.  After ablation, he has had no further dysrhythmic symptoms of any significance.  His EP provider apparently opted to keep him on anticoagulation and we have not adjusted that.  He denies chest pain.  He has stable dyspnea.  He has no failure nor dysrhythmic symptoms otherwise.  He reports that he tolerates medications without complication.  He has no further complaints.    Current Outpatient Medications on File Prior to Visit   Medication Sig Dispense Refill    aspirin 81 MG EC tablet Take 1 tablet by mouth Daily.      atorvastatin (LIPITOR) 40 MG tablet TAKE 1 TABLET BY MOUTH DAILY 30 tablet 2    Eliquis 5 MG tablet tablet TAKE 1 TABLET BY MOUTH TWICE DAILY 60 tablet 1    metoprolol succinate XL (TOPROL-XL) 50 MG 24 hr tablet Take 1 tablet by mouth Daily.       No current facility-administered medications on file prior to visit.       Patient has no known allergies.    Past Medical History:   Diagnosis Date    Abnormal heart rhythm     Anxiety     CHF (congestive heart failure)     Diabetes mellitus     Easy bruising     Heart failure     Hernia, hiatal     Hyperlipidemia     Hypertension     Irregular heart beat     On home oxygen therapy     2L NC during the day; patient states he does not wear any at night    Vascular disease     Wears  "glasses     reading glasses       Social History     Socioeconomic History    Marital status:    Tobacco Use    Smoking status: Former     Current packs/day: 0.00     Types: Cigarettes     Quit date: 9/24/2013     Years since quitting: 10.8    Smokeless tobacco: Current     Types: Chew     Last attempt to quit: 7/24/2019   Substance and Sexual Activity    Alcohol use: No    Drug use: No    Sexual activity: Defer       Family History   Problem Relation Age of Onset    Heart attack Mother     Cancer Father        Review of Systems   Constitutional: Negative.  Negative for chills, diaphoresis, fatigue and fever.   HENT: Negative.     Eyes: Negative.  Negative for visual disturbance.   Respiratory: Negative.  Negative for apnea, cough, chest tightness, shortness of breath and wheezing.    Cardiovascular: Negative.  Negative for chest pain, palpitations and leg swelling.   Gastrointestinal: Negative.  Negative for abdominal pain and blood in stool.   Endocrine: Negative.    Genitourinary: Negative.  Negative for hematuria.   Musculoskeletal:  Positive for arthralgias. Negative for back pain, myalgias, neck pain and neck stiffness.   Skin: Negative.  Negative for rash and wound.   Allergic/Immunologic: Negative.  Negative for environmental allergies and food allergies.   Neurological: Negative.  Negative for dizziness, syncope, weakness, light-headedness, numbness and headaches.   Hematological:  Bruises/bleeds easily (on aspirin and eliquis).   Psychiatric/Behavioral: Negative.  Negative for sleep disturbance.        Objective   Vitals:    08/14/24 1024   BP: 117/56   Pulse: 80   SpO2: 96%   Weight: 87.1 kg (192 lb)   Height: 185.4 cm (73\")      /56   Pulse 80   Ht 185.4 cm (73\")   Wt 87.1 kg (192 lb)   SpO2 96%   BMI 25.33 kg/m²    Lab Results (most recent)       None          Physical Exam  Vitals and nursing note reviewed.   Constitutional:       General: He is not in acute distress.     Appearance: " He is well-developed.   HENT:      Head: Normocephalic and atraumatic.   Eyes:      Conjunctiva/sclera: Conjunctivae normal.      Pupils: Pupils are equal, round, and reactive to light.   Neck:      Vascular: No JVD.      Trachea: No tracheal deviation.   Cardiovascular:      Rate and Rhythm: Normal rate and regular rhythm.      Heart sounds: Normal heart sounds.   Pulmonary:      Effort: Pulmonary effort is normal.      Breath sounds: Normal breath sounds.   Abdominal:      General: Bowel sounds are normal. There is no distension.      Palpations: Abdomen is soft. There is no mass.      Tenderness: There is no abdominal tenderness. There is no guarding or rebound.   Musculoskeletal:         General: No tenderness or deformity. Normal range of motion.      Cervical back: Normal range of motion and neck supple.   Skin:     General: Skin is warm and dry.      Coloration: Skin is not pale.      Findings: No erythema or rash.   Neurological:      Mental Status: He is alert and oriented to person, place, and time.   Psychiatric:         Behavior: Behavior normal.         Thought Content: Thought content normal.         Judgment: Judgment normal.           Procedure     ECG 12 Lead    Date/Time: 8/14/2024 10:34 AM  Performed by: Fuad Lugo PA    Authorized by: Fuad Lugo PA  Comparison: compared with previous ECG from 2/20/2023             Assessment & Plan      Diagnosis Plan   1. Shortness of breath        2. History of atrial flutter        3. Essential hypertension          1.  At this time, the patient is doing well.  He has had no further dysrhythmic symptoms post ablation of atrial flutter.  I would continue medical regimen without change.    2.  As the patient is doing well, nothing further.  For change in clinical course he will call immediately.    3.  We will continue to see him on 6 to 9-month intervals, sooner for complications.           Sung Merino  reports that he quit smoking about 10  years ago. His smoking use included cigarettes. His smokeless tobacco use includes chew. I have educated him on the risk of diseases from using tobacco products such as cancer, COPD, and heart disease.     I advised him to quit and he is not willing to quit.    I spent 3  minutes counseling the patient.          Advance Care Planning   ACP discussion was held with the patient during this visit. Patient does not have an advance directive, declines further assistance.                Electronically signed by:

## 2024-11-11 RX ORDER — ATORVASTATIN CALCIUM 40 MG/1
40 TABLET, FILM COATED ORAL DAILY
Qty: 30 TABLET | Refills: 2 | Status: SHIPPED | OUTPATIENT
Start: 2024-11-11

## 2025-04-15 ENCOUNTER — OFFICE VISIT (OUTPATIENT)
Dept: CARDIOLOGY | Facility: CLINIC | Age: 85
End: 2025-04-15
Payer: MEDICARE

## 2025-04-15 VITALS
SYSTOLIC BLOOD PRESSURE: 136 MMHG | BODY MASS INDEX: 24.12 KG/M2 | HEIGHT: 73 IN | OXYGEN SATURATION: 95 % | HEART RATE: 72 BPM | WEIGHT: 182 LBS | DIASTOLIC BLOOD PRESSURE: 65 MMHG

## 2025-04-15 DIAGNOSIS — I10 ESSENTIAL HYPERTENSION: ICD-10-CM

## 2025-04-15 DIAGNOSIS — R06.02 SHORTNESS OF BREATH: Primary | ICD-10-CM

## 2025-04-15 DIAGNOSIS — Z86.79 HISTORY OF ATRIAL FLUTTER: ICD-10-CM

## 2025-04-15 PROCEDURE — 1159F MED LIST DOCD IN RCRD: CPT | Performed by: PHYSICIAN ASSISTANT

## 2025-04-15 PROCEDURE — 99213 OFFICE O/P EST LOW 20 MIN: CPT | Performed by: PHYSICIAN ASSISTANT

## 2025-04-15 PROCEDURE — 1160F RVW MEDS BY RX/DR IN RCRD: CPT | Performed by: PHYSICIAN ASSISTANT

## 2025-04-15 RX ORDER — MEMANTINE HYDROCHLORIDE 5 MG/1
5 TABLET ORAL 2 TIMES DAILY
COMMUNITY

## 2025-04-15 RX ORDER — DONEPEZIL HYDROCHLORIDE 10 MG/1
10 TABLET, FILM COATED ORAL NIGHTLY
COMMUNITY

## 2025-04-15 NOTE — PROGRESS NOTES
Problem list     Subjective   Sung Merino is a 85 y.o. male     Chief Complaint   Patient presents with    Follow-up     8 month follow up, patient denies chest pain, increased shortness of breath or palpitation's.     Patient did not bring in medication's with visit, I called [facility] pharmacy and requested a medication list be faxed.    Reports got dizzy yesterday and fell.     Dizziness   Problem List:  1.  Atrial flutter, status post ablation, 2019.  2.  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  His dilated cardiomyopathy presumably was related to atrial flutter/rapid ventricular response rates.  2.1.  Low normal but preserved systolic function per echocardiogram, 10/19.  2.2.  Repeat echocardiogram, 4/2023, suggesting EF of 55 to 60%.  3.  Hypertension  4.  Dyslipidemia    HPI  The patient presents to the clinic today for routine evaluation and follow-up.  For the most part, he has continued to do very well from cardiac standpoint since last evaluation.  He denies dysrhythmic symptoms post ablation.  He has no evidence of failure.  There is been no chest pain or anginal equivalent symptoms otherwise.  He acknowledges that he typically is normotensive.  He feels that he is doing very well and has no specific complaints from cardiac standpoint.    Current Outpatient Medications on File Prior to Visit   Medication Sig Dispense Refill    atorvastatin (LIPITOR) 40 MG tablet TAKE 1 TABLET BY MOUTH DAILY 30 tablet 2    donepezil (ARICEPT) 10 MG tablet Take 1 tablet by mouth Every Night.      Eliquis 5 MG tablet tablet TAKE 1 TABLET BY MOUTH TWICE DAILY 60 tablet 1    memantine (NAMENDA) 5 MG tablet Take 1 tablet by mouth 2 (Two) Times a Day.      aspirin 81 MG EC tablet Take 1 tablet by mouth Daily.      [DISCONTINUED] metoprolol succinate XL (TOPROL-XL) 50 MG 24 hr tablet Take 1 tablet by mouth Daily.       No current facility-administered medications on file prior to visit.        Patient has no known allergies.    Past Medical History:   Diagnosis Date    Abnormal heart rhythm     Anxiety     CHF (congestive heart failure)     Diabetes mellitus     Easy bruising     Heart failure     Hernia, hiatal     Hyperlipidemia     Hypertension     Irregular heart beat     On home oxygen therapy     2L NC during the day; patient states he does not wear any at night    Vascular disease     Wears glasses     reading glasses       Social History     Socioeconomic History    Marital status:    Tobacco Use    Smoking status: Former     Current packs/day: 0.00     Types: Cigarettes     Quit date: 2013     Years since quittin.5    Smokeless tobacco: Current     Types: Chew     Last attempt to quit: 2019   Substance and Sexual Activity    Alcohol use: No    Drug use: No    Sexual activity: Defer       Family History   Problem Relation Age of Onset    Heart attack Mother     Cancer Father        Review of Systems   Constitutional: Negative.  Negative for chills, diaphoresis, fatigue and fever.   HENT: Negative.  Negative for hearing loss.    Eyes: Negative.  Negative for visual disturbance.   Respiratory: Negative.  Negative for apnea, chest tightness, shortness of breath, wheezing and stridor.    Cardiovascular: Negative.  Negative for chest pain, palpitations and leg swelling.   Gastrointestinal: Negative.  Negative for abdominal pain and blood in stool.   Endocrine: Negative.    Genitourinary: Negative.  Negative for hematuria.   Musculoskeletal:  Positive for arthralgias and gait problem (reports fall yesterday). Negative for back pain, myalgias, neck pain and neck stiffness.   Skin: Negative.  Negative for rash and wound.   Allergic/Immunologic: Negative.  Negative for environmental allergies and food allergies.   Neurological:  Positive for dizziness. Negative for syncope, weakness, light-headedness, numbness and headaches.   Hematological:  Bruises/bleeds easily.  "  Psychiatric/Behavioral: Negative.  Negative for sleep disturbance.        Objective   Vitals:    04/15/25 1001   BP: 136/65   Pulse: 72   SpO2: 95%   Weight: 82.6 kg (182 lb)   Height: 185.4 cm (73\")      /65   Pulse 72   Ht 185.4 cm (73\")   Wt 82.6 kg (182 lb)   SpO2 95%   BMI 24.01 kg/m²    Lab Results (most recent)       None          Physical Exam  Vitals and nursing note reviewed.   Constitutional:       General: He is not in acute distress.     Appearance: He is well-developed.   HENT:      Head: Normocephalic and atraumatic.   Eyes:      Conjunctiva/sclera: Conjunctivae normal.      Pupils: Pupils are equal, round, and reactive to light.   Neck:      Vascular: No JVD.      Trachea: No tracheal deviation.   Cardiovascular:      Rate and Rhythm: Normal rate and regular rhythm.      Heart sounds: Normal heart sounds.   Pulmonary:      Effort: Pulmonary effort is normal.      Breath sounds: Normal breath sounds.   Abdominal:      General: Bowel sounds are normal. There is no distension.      Palpations: Abdomen is soft. There is no mass.      Tenderness: There is no abdominal tenderness. There is no guarding or rebound.   Musculoskeletal:         General: No tenderness or deformity. Normal range of motion.      Cervical back: Normal range of motion and neck supple.   Skin:     General: Skin is warm and dry.      Coloration: Skin is not pale.      Findings: No erythema or rash.   Neurological:      Mental Status: He is alert and oriented to person, place, and time.   Psychiatric:         Behavior: Behavior normal.         Thought Content: Thought content normal.         Judgment: Judgment normal.           Procedure   Procedures       Assessment & Plan      Diagnosis Plan   1. Shortness of breath        2. History of atrial flutter        3. Essential hypertension          1.  At this time, the patient continues to do well.  He reports no specific cardiovascular symptoms or issues.  He feels that he " is doing very well.    2.  He is on appropriate medical regimen which I would not alter at this time.    3.  Change in clinical course he would return immediately.  Nothing further for now and we will see him on 6 to 9-month intervals.           Sung FERGUSON Autumnvinod  reports that he quit smoking about 11 years ago. His smoking use included cigarettes. His smokeless tobacco use includes chew. I have educated him on the risk of diseases from using tobacco products such as cancer, COPD, and heart disease.     I advised him to quit and he is not willing to quit.    I spent 3  minutes counseling the patient.          Advance Care Planning   ACP discussion was held with the patient during this visit. Patient does not have an advance directive, declines further assistance.                Electronically signed by:

## (undated) DEVICE — SI AVANTI+ 7F STD W/GW  NO OBT: Brand: AVANTI

## (undated) DEVICE — DRSNG SURESITE123 4X4.8IN

## (undated) DEVICE — ST EXT IV SMARTSITE 2VLV SP M LL 5ML IV1

## (undated) DEVICE — DECANT BG O JET

## (undated) DEVICE — SI AVANTI+ 8F STD W/GW  NO OBT: Brand: AVANTI

## (undated) DEVICE — CATH EP SUPRM QUADPOLAR JSN 5F 5MM 120CM

## (undated) DEVICE — LEX ELECTRO PHYSIOLOGY: Brand: MEDLINE INDUSTRIES, INC.

## (undated) DEVICE — ADULT, W/LG. BACK PAD, RADIOTRANSPARENT ELEMENT AND LEAD WIRE: Brand: DEFIBRILLATION ELECTRODES

## (undated) DEVICE — SET PRIMARY GRVTY 10DP MALE LL 104IN

## (undated) DEVICE — CATH EP INQUIRY H CRV 7F 1-7-1MM 110CM

## (undated) DEVICE — AVANTI + 5F STD W/GW: Brand: AVANTI

## (undated) DEVICE — TEMPERATURE ABLATION CATHETER: Brand: BLAZER® II XP

## (undated) DEVICE — LIMB HOLDER, WRIST/ANKLE: Brand: DEROYAL

## (undated) DEVICE — ST INF PRI SMRTSTE 20DRP 2VLV 24ML 117